# Patient Record
Sex: MALE | Race: WHITE
[De-identification: names, ages, dates, MRNs, and addresses within clinical notes are randomized per-mention and may not be internally consistent; named-entity substitution may affect disease eponyms.]

---

## 2019-01-07 ENCOUNTER — HOSPITAL ENCOUNTER (INPATIENT)
Dept: HOSPITAL 59 - ER | Age: 69
LOS: 2 days | Discharge: HOME | DRG: 192 | End: 2019-01-09
Attending: INTERNAL MEDICINE | Admitting: INTERNAL MEDICINE
Payer: COMMERCIAL

## 2019-01-07 DIAGNOSIS — I25.2: ICD-10-CM

## 2019-01-07 DIAGNOSIS — J45.909: ICD-10-CM

## 2019-01-07 DIAGNOSIS — J44.1: Primary | ICD-10-CM

## 2019-01-07 DIAGNOSIS — I48.91: ICD-10-CM

## 2019-01-07 DIAGNOSIS — R09.02: ICD-10-CM

## 2019-01-07 DIAGNOSIS — Z95.5: ICD-10-CM

## 2019-01-07 DIAGNOSIS — E78.5: ICD-10-CM

## 2019-01-07 DIAGNOSIS — R91.8: ICD-10-CM

## 2019-01-07 DIAGNOSIS — Z79.01: ICD-10-CM

## 2019-01-07 LAB
ALBUMIN SERPL-MCNC: 3.9 G/DL (ref 4–5)
ALBUMIN/GLOB SERPL: 1 {RATIO} (ref 1.1–1.8)
ALP SERPL-CCNC: 103 U/L (ref 55–149)
ALT SERPL-CCNC: 12 U/L (ref ?–41)
ANION GAP SERPL CALC-SCNC: 14 MMOL/L (ref 7–16)
AST SERPL-CCNC: 13 U/L (ref 10–50)
BASOPHILS NFR BLD: 0.1 % (ref 0–6)
BILIRUB SERPL-MCNC: 1 MG/DL (ref 0.2–1)
BUN SERPL-MCNC: 15 MG/DL (ref 8–23)
CO2 SERPL-SCNC: 26 MMOL/L (ref 22–29)
CREAT SERPL-MCNC: 0.9 MG/DL (ref 0.7–1.2)
EOSINOPHIL NFR BLD: 1 % (ref 0–6)
ERYTHROCYTE [DISTWIDTH] IN BLOOD BY AUTOMATED COUNT: 14.5 % (ref 11.5–14.5)
EST GLOMERULAR FILTRATION RATE: > 60 ML/MIN
GLOBULIN SER-MCNC: 3.8 GM/DL (ref 1.4–4.8)
GLUCOSE SERPL-MCNC: 134 MG/DL (ref 74–109)
GRANULOCYTES NFR BLD: 74.6 % (ref 47–80)
HCT VFR BLD CALC: 46.4 % (ref 42–52)
HGB BLD-MCNC: 15.3 GM/DL (ref 14–18)
LYMPHOCYTES NFR BLD AUTO: 13.2 % (ref 16–45)
MCH RBC QN AUTO: 30.5 PG (ref 27–33)
MCHC RBC AUTO-ENTMCNC: 33 G/DL (ref 32–36)
MCV RBC AUTO: 92.4 FL (ref 81–97)
MONOCYTES NFR BLD: 11.1 % (ref 0–9)
PLATELET # BLD: 165 K/UL (ref 130–400)
PMV BLD AUTO: 9.3 FL (ref 7.4–10.4)
PROT SERPL-MCNC: 7.7 G/DL (ref 6.6–8.7)
RBC # BLD AUTO: 5.02 M/UL (ref 4.4–5.7)
WBC # BLD AUTO: 12.1 K/UL (ref 4.2–12.2)

## 2019-01-07 PROCEDURE — 99223 1ST HOSP IP/OBS HIGH 75: CPT

## 2019-01-07 PROCEDURE — 96374 THER/PROPH/DIAG INJ IV PUSH: CPT

## 2019-01-07 PROCEDURE — 99239 HOSP IP/OBS DSCHRG MGMT >30: CPT

## 2019-01-07 PROCEDURE — 84484 ASSAY OF TROPONIN QUANT: CPT

## 2019-01-07 PROCEDURE — 80053 COMPREHEN METABOLIC PANEL: CPT

## 2019-01-07 PROCEDURE — 94761 N-INVAS EAR/PLS OXIMETRY MLT: CPT

## 2019-01-07 PROCEDURE — 93010 ELECTROCARDIOGRAM REPORT: CPT

## 2019-01-07 PROCEDURE — 99285 EMERGENCY DEPT VISIT HI MDM: CPT

## 2019-01-07 PROCEDURE — 94640 AIRWAY INHALATION TREATMENT: CPT

## 2019-01-07 PROCEDURE — 93005 ELECTROCARDIOGRAM TRACING: CPT

## 2019-01-07 PROCEDURE — 85027 COMPLETE CBC AUTOMATED: CPT

## 2019-01-07 PROCEDURE — 71045 X-RAY EXAM CHEST 1 VIEW: CPT

## 2019-01-07 PROCEDURE — 85025 COMPLETE CBC W/AUTO DIFF WBC: CPT

## 2019-01-07 NOTE — EMERGENCY DEPARTMENT RECORD
History of Present Illness





- General


Chief Complaint: Shortness of breath


Stated Complaint: CAITLIN


Time Seen by Provider: 19 21:42


Source: Patient


Mode of Arrival: Wheelchair


Limitations: No limitations





- History of Present Illness


Initial Comments: 





69 yo male presents to ED for evaluation of difficulty in breathing this evening

, reports a history of COPD and pneumothorax.  Patient denies fevers, chills, 

or productive cough symptoms.  Patient denies chest discomfort symptoms, 

history of PE, or lower extremity edema.  


MD Complaint: Shortness of breath


Onset/Timin


-: Days(s)


Severity: Moderate


Consistency: Constant


Improves With: Oxygen


Known History Of: COPD


Associated Symptoms: Denies other symptoms





- Related Data


Home Oxygen Therapy: No


 Home Medications











 Medication  Instructions  Recorded  Confirmed  Last Taken


 


Alendronate Sodium [Fosamax] 70 mg PO WEEKLY 19


 


Apixaban [Eliquis] 5 mg PO ASDIR 19


 


Atorvastatin Calcium 80 mg PO DAILY 19


 


Budesonide/Formoterol Fumarate 6 gm IH DAILY 19





[Symbicort 160-4.5 Mcg Inhaler]    


 


Magnesium Oxide [Magnesium] 250 mg PO DAILY 19


 


Metoprolol Succinate 25 mg PO DAILY 19


 


Nitroglycerin 0.4MG [Nitrostat 1 tab SL ASDIR 19





0.4MG]    


 


Prednisone [Prednisone 10Mg] 10 mg PO DAILY 19


 


Tiotropium Bromide [Spiriva] 18 mcg IH DAILY 19


 


Ubidecarenone [Coenzyme Q10] 30 mg PO DAILY 19











 Allergies











Allergy/AdvReac Type Severity Reaction Status Date / Time


 


morphine AdvReac  BEHAVIORAL Verified 19 21:53





   CHANGES  














Review of Systems


Constitutional: Denies: Chills, Fever, Malaise, Night sweats


Eyes: Denies: Eye discharge, Eye pain


ENT: Denies: Congestion, Ear pain, Epistaxis


Respiratory: Reports: Dyspnea.  Denies: Cough


Cardiovascular: Reports: Dyspnea on exertion.  Denies: Chest pain, Edema


Endocrine: Denies: Fatigue, Heat or cold intolerance


Gastrointestinal: Denies: Abdominal pain, Nausea, Vomiting


Genitourinary: Denies: Incontinence, Retention


Musculoskeletal: Denies: Arthralgia, Back pain, Gout, Joint swelling


Skin: Denies: Bruising, Change in color, Change in hair/nails


Neurological: Denies: Abnormal gait, Confusion, Headache, Seizure


Psychiatric: Denies: Anxiety


Hematological/Lymphatic: Denies: Anemia, Blood Clots





Physical Exam





- General


General Appearance: Alert, Oriented x3, Cooperative, Moderate distress


Limitations: No limitations





- Head


Head exam: Atraumatic, Normocephalic, Normal inspection


Head exam detail: negative: Abrasion, Contusion, Bautista's sign, General 

tenderness, Hematoma, Laceration





- Eye


Eye exam: Normal appearance.  negative: Conjunctival injection, Periorbital 

swelling, Periorbital tenderness, Scleral icterus





- ENT


Ear exam: negative: Auricular hematoma, Auricular trauma


Nasal Exam: negative: Active bleeding, Discharge, Dried blood, Foreign body


Mouth exam: negative: Drooling, Laceration, Muffled voice, Tongue elevation





- Neck


Neck exam: Normal inspection.  negative: Meningismus, Tenderness





- Respiratory


Respiratory exam: Decreased breath sounds, Respiratory distress.  negative: 

Rales, Rhonchi, Stridor





- Cardiovascular


Cardiovascular Exam: Normal rhythm, Normal heart sounds, Tachycardia





- GI/Abdominal


GI/Abdominal exam: Soft.  negative: Rebound, Rigid, Tenderness





- Rectal


Rectal exam: Deferred





- 


 exam: Deferred





- Extremities


Extremities exam: Normal inspection.  negative: Calf tenderness, Pedal edema, 

Tenderness





- Back


Back exam: Denies: CVA tenderness (R), CVA tenderness (L)





- Neurological


Neurological exam: Alert, Normal gait, Oriented X3





- Psychiatric


Psychiatric exam: Normal affect, Normal mood





- Skin


Skin exam: Normal color.  negative: Abrasion


Type of lesion: negative: abrasion





Course





- Reevaluation(s)


Reevaluation #1: 





19 21:47


Patient was seen and examined, cyanosis noted to the fingers c/w hypoxia, 

moderate respiratory distress is present on examination.


Duoneb and solumedrol ordered as well as laboratory studies/portable chest film.


Reevaluation #2: 





19 21:50


EKG: Sinus tachycardia 103


Normal axis, normal intervals


No acute ST-T wave changes are identified.


Reevaluation #3: 





19 22:15


Laboratory studies were reviewed and appear grossly unremarkable for an acute 

process.


Reevaluation #4: 





19 23:06


Patient was reassessed following 2nd treatment, patient id beginning to move 

more air.  


Pulse 90's, biox 92% on 2 L NC.  Will admit for COPD exacerbation.


Reevaluation #5: 





19 07:50


Case was discussed with Dolly (ARELY), will accept admission at this time.





Medical Decision Making





- Lab Data


Result diagrams: 


 19 21:42





 19 21:42





Disposition


Disposition: Admit


Clinical Impression: 


 COPD exacerbation, Hypoxia





Disposition: Still a Patient at [facility] ([facility]


Decision to Admit: Admit from ER


Decision to Admit Date: 19


Decision to Admit Time: 23:07


Condition: (2) Stable


Time of Disposition: 23:08





Quality





- Quality Measures


Quality Measures: N/A





- Blood Pressure Screening


Does Patient Have Any of the Following: Active Dx of HTN


Blood Pressure Classification: Pre-Hypertensive BP Reading


Systolic Measurement: 128


Diastolic Measurement: 75


Screening for High Blood Pressure: Patient Exclusion, Hx of HTN []

## 2019-01-08 RX ADMIN — ALBUTEROL SULFATE SCH MG: 2.5 SOLUTION RESPIRATORY (INHALATION) at 14:22

## 2019-01-08 RX ADMIN — ALBUTEROL SULFATE SCH MG: 2.5 SOLUTION RESPIRATORY (INHALATION) at 18:11

## 2019-01-08 RX ADMIN — NICOTINE SCH: 14 PATCH, EXTENDED RELEASE TOPICAL at 10:59

## 2019-01-08 RX ADMIN — DEXTROSE MONOHYDRATE SCH MLS/HR: 5 INJECTION, SOLUTION INTRAVENOUS at 03:01

## 2019-01-08 RX ADMIN — IPRATROPIUM BROMIDE AND ALBUTEROL SULFATE SCH ML: .5; 2.5 SOLUTION RESPIRATORY (INHALATION) at 05:43

## 2019-01-08 RX ADMIN — IPRATROPIUM BROMIDE AND ALBUTEROL SULFATE SCH: .5; 2.5 SOLUTION RESPIRATORY (INHALATION) at 10:27

## 2019-01-08 RX ADMIN — SODIUM CHLORIDE PRN MLS/HR: 0.9 INJECTION, SOLUTION INTRAVENOUS at 03:01

## 2019-01-08 RX ADMIN — METHYLPREDNISOLONE SODIUM SUCCINATE SCH MG: 125 INJECTION, POWDER, FOR SOLUTION INTRAMUSCULAR; INTRAVENOUS at 22:26

## 2019-01-08 RX ADMIN — ALBUTEROL SULFATE SCH MG: 2.5 SOLUTION RESPIRATORY (INHALATION) at 10:15

## 2019-01-08 RX ADMIN — ALBUTEROL SULFATE SCH MG: 2.5 SOLUTION RESPIRATORY (INHALATION) at 21:52

## 2019-01-08 RX ADMIN — NICOTINE SCH PATCH: 14 PATCH, EXTENDED RELEASE TOPICAL at 07:38

## 2019-01-08 NOTE — RADIOLOGY REPORT
EXAM:  AP CHEST 



HISTORY:  DIFFICULTY IN BREATHING, HISTORY OF COPD.  



TECHNIQUE:  AP portable views of the chest were obtained.  



Comparison:  None.  



FINDINGS:  The heart size is normal.  The lungs do appear hyperinflated 
suggesting COPD.  There is some interstitial infiltrate particularly in the 
left upper lobe.  This may be chronic fibrosis although old films would be 
useful to confirm a chronic appearance and exclude an acute infiltrate in the 
left upper lobe.  Alternatively, short term follow-up could also be obtained to 
see if there is any change in this pattern to suggest an acute component.  
There is also blunting of the left lateral costophrenic angle by fluid or 
thickened pleura.  No pneumothorax evident.  There is probably a small anchor 
screw overlying the right humeral head related to prior shoulder surgery.  



IMPRESSION:  

1.  HYPERINFLATION CONSISTENT WITH COPD.  



2.  BLUNTING OF THE LEFT LATERAL COSTOPHRENIC ANGLE BY FLUID OR THICKENED 
PLEURA.  



3.  STREAKY INFILTRATE LEFT UPPER LOBE COULD BE ACUTE INFILTRATE OR CHRONIC 
FIBROSIS.  COMPARISON WITH OLD FILMS OR SHORT TERM FOLLOW-UP MAY BE USEFUL IN 
THIS DISTINCTION.  



4.  APPARENT POSTOP CHANGE RIGHT SHOULDER.  



JOB NUMBER:  [id number]
[facility]

## 2019-01-08 NOTE — HISTORY & PHYSICAL
History of Present Illness





- Date of Service


Date of Service for History & Physical: 01/08/19





- History of Present Illness


Admitting Diagnosis: COPD exacerbation.  Hypoxia


History of Present Illness: 


   Mr. Hussein is a 69yo male who presented to the ED on the evening of 1/07 

for evaluation of difficulty in breathing, reports a history of COPD and 

pneumothorax.  Patient denies fevers, chills, or productive cough symptoms.  

Wife does state that he has been more tired than usual since last Friday.  

Patient denies chest discomfort symptoms, history of PE, or lower extremity 

edema.  His history includes:  COPD, hyperlipidemia, every day smoker.  





   In the ED, he was noted to have cyanosis of fingers, moderate respiratory 

distress up on examination.  Duoneb and solumedrol ordered as well as 

laboratory studies/portable chest film.  EKG: Sinus tachycardia 103, normal axis

, normal intervals, no acute ST-T wave changes are identified.  Labs were 

negative for acute process.  Chest xray showed hyperinflation, LIZZ streaky 

infiltrates vs. chronic fibrosis, levaquin was initiated.  He was admitted for 

COPD exacerbation.





1/08/19: 


  Pt. is resting in bed, he reports much improved work of breathing.  95% 

oxygen on 2L nc.  Will plan to continue home symbicort, albuterol nebs q4h 

while awake, levaquin 750mg q24h, and solumedrol 60mg daily.  Will continue to 

monitor vitals and labs.   











PCP:  Keily Goodman


Pulmolologist:  Dr. Gonsales





Travel Screening





- Travel/Exposure Within Last 30 Days


Have you traveled within the last 30 days?: No





- Travel/Exposure Within Last Year


Have you traveled outside the [place] in the last year?: No





- Additonal Travel Details


Have you been exposed to anyone with a communicable illness?: No





- Travel Symptoms


Symptom Screening: Lack of Appetite





Review of Systems


Constitutional: Denies: Chills, Fever, Malaise, Night sweats


Eyes: Denies: Eye discharge, Eye pain


ENT: Denies: Congestion, Ear pain, Epistaxis


Respiratory: Reports: Dyspnea.  Denies: Cough


Cardiovascular: Reports: Dyspnea on exertion.  Denies: Chest pain, Edema


Endocrine: Denies: Fatigue, Heat or cold intolerance


Gastrointestinal: Denies: Abdominal pain, Nausea, Vomiting


Genitourinary: Denies: Incontinence, Retention


Musculoskeletal: Denies: Arthralgia, Back pain, Gout, Joint swelling


Skin: Denies: Bruising, Change in color, Change in hair/nails


Neurological: Denies: Abnormal gait, Confusion, Headache, Seizure


Psychiatric: Denies: Anxiety


Hematological/Lymphatic: Denies: Anemia, Blood Clots





Past Medical History





- SOCIAL HISTORY


Smoking Status: Heavy tobacco smoker (>10/day)





- RESPIRATORY


Hx Respiratory Disorders: Yes


Hx COPD: Yes





- CARDIOVASCULAR


Hx Cardio Disorders: Yes


Hx Chest Pain: Yes


Hx Heart Attack: Yes


Hx Irregular Heartbeat: Yes


Hx Palpitations: Yes





- NEURO


Hx Neuro Disorders: Yes


Hx Seizures: Yes ("non-epileptiform spells", last 2016)





- GI


Hx GI Disorders: No





- 


Hx Genitourinary Disorders: No





- ENDOCRINE


Hx Endocrine Disorders: No





- MUSCULOSKELETAL


Hx Musculoskeletal Disorders: Yes


Hx Back Injury: Yes (L4-5 cage)





- PSYCH


Hx Psych Problems: Yes


Hx Anxiety: Yes


Hx Depression: Yes





- HEMATOLOGY/ONCOLOGY


Hx Hematology/Oncology Disorders: No


Comment:: on blood thinners for afib





Family Medical History


Any Significant Family History?: Yes


Hx Cancer: Father





H&P Meds/Allergies





- Allergies


Allergies: 


 Allergies











Allergy/AdvReac Type Severity Reaction Status Date / Time


 


morphine AdvReac  BEHAVIORAL Verified 01/07/19 21:53





   CHANGES  














- Home Medications


 Home Medications











 Medication  Instructions  Recorded  Confirmed  Last Taken


 


Alendronate Sodium [Fosamax] 70 mg PO WEEKLY 01/07/19 01/07/19 01/07/19


 


Apixaban [Eliquis] 5 mg PO BID 01/07/19 01/08/19 01/07/19


 


Atorvastatin Calcium 80 mg PO QHS 01/07/19 01/08/19 01/07/19


 


Budesonide/Formoterol Fumarate 2 inh IH BID 01/07/19 01/08/19 01/07/19





[Symbicort 160-4.5 Mcg Inhaler]    


 


Metoprolol Succinate 25 mg PO DAILY 01/07/19 01/07/19 01/07/19


 


Nitroglycerin 0.4MG [Nitrostat 1 tab SL Q5MIN PRN 01/07/19 01/08/19 01/07/19





0.4MG]    


 


Prednisone [Prednisone 10Mg] 10 mg PO QHS 01/07/19 01/08/19 01/07/19


 


Tiotropium Bromide [Spiriva] 18 mcg IH DAILY 01/07/19 01/07/19 01/07/19


 


Ubidecarenone [Coenzyme Q10] 30 mg PO QHS 01/07/19 01/08/19 01/07/19














- Active Medications


Active Medications: 


 Current Medications





Acetaminophen (Tylenol 500mg Tab)  1,000 mg PO Q6H PRN


   PRN Reason: PAIN - MILD(1-4)/FEVER


Albuterol Sulfate (Albuterol Sulfate)  2.5 mg INH RESP.Q1H PRN


   PRN Reason: DIFFICULTY IN BREATHING


Albuterol Sulfate (Albuterol Sulfate)  2.5 mg INH RESP.Q4H.[facility]


   Last Admin: 01/08/19 10:15 Dose:  2.5 mg


Sodium Chloride ()  1,000 mls @ 100 mls/hr IV .Q10H PRN


   PRN Reason: LARGE VOLUME IV


   Last Admin: 01/08/19 03:01 Dose:  100 mls/hr


Levofloxacin/Dextrose (Levaquin 750mg Ivpb)  750 mg in 150 mls @ 125 mls/hr 

IVPB Q24H [facility]


   Stop: 01/13/19 03:01


   Last Infusion: 01/08/19 04:56 Dose:  Infused


Methylprednisolone Sodium Succinate (Solu-Medrol)  125 mg IVP DAILY [facility]


Nicotine (Nicotine 14mg)  1 patch TD Q24H [facility]


   Last Admin: 01/08/19 10:59 Dose:  Not Given


Patient Own Med: (Atorvastatin 80mg)  1 each PO QHS [facility]


Patient Own Med: (Symbicort 160/4.5)  2 each INH BID [facility]


Patient Own Med: Metoprolol Succinate 25 Mg  1 each PO DAILY [facility]


Patient Own Med: (Spiriva Handihaler)  1 each INH DAILY [facility]


Patient Own Med: (Apixaban 5 Mg)  1 each PO BID [facility]











Physical Exam





- Vital Signs


Vital Signs: 


 Vital Signs - Last 24 Hrs











  Temp Pulse Pulse Pulse Resp BP BP


 


 01/08/19 10:17   71    16  


 


 01/08/19 10:15  97.7 F      104/59 


 


 01/08/19 08:00  97.7 F    74  20   104/59


 


 01/08/19 05:43   71    20  


 


 01/07/19 23:49  97.5 F L    98 H  17  


 


 01/07/19 23:31    90   26 H   109/48


 


 01/07/19 22:44   100 H    18  


 


 01/07/19 22:02  98.9 F   104 H   28 H   113/69


 


 01/07/19 21:45   107 H    16  


 


 01/07/19 21:35   121 H    40 H  128/75 














  Pulse Ox


 


 01/08/19 10:17  95


 


 01/08/19 10:15 


 


 01/08/19 08:00  93 L


 


 01/08/19 05:43  95


 


 01/07/19 23:49  93 L


 


 01/07/19 23:31  92 L


 


 01/07/19 22:44 


 


 01/07/19 22:02  93 L


 


 01/07/19 21:45 


 


 01/07/19 21:35  85 L














- General


General Appearance: Alert, Oriented x3, Cooperative, No acute distress


Limitations: No limitations





- Head


Head exam: Atraumatic, Normocephalic, Normal inspection


Head exam detail: negative: Abrasion, Contusion, Bautista's sign, General 

tenderness, Hematoma, Laceration





- Eye


Eye exam: Normal appearance.  negative: Conjunctival injection, Periorbital 

swelling, Periorbital tenderness, Scleral icterus





- ENT


Ear exam: negative: Auricular hematoma, Auricular trauma


Nasal Exam: negative: Active bleeding, Discharge, Dried blood, Foreign body


Mouth exam: negative: Drooling, Laceration, Muffled voice, Tongue elevation





- Neck


Neck exam: Normal inspection.  negative: Meningismus, Tenderness





- Respiratory


Respiratory exam: Wheezes (expiratory).  negative: Rales, Rhonchi, Stridor





- Cardiovascular


Cardiovascular Exam: Regular rate, Normal rhythm, Normal heart sounds





- GI/Abdominal


GI/Abdominal exam: Soft.  negative: Rebound, Rigid, Tenderness





- Rectal


Rectal exam: Deferred





- 


 exam: Deferred





- Extremities


Extremities exam: Normal inspection.  negative: Calf tenderness, Pedal edema, 

Tenderness





- Back


Back exam: Denies: CVA tenderness (R), CVA tenderness (L)





- Neurological


Neurological exam: Alert, Normal gait, Oriented X3





- Psychiatric


Psychiatric exam: Normal affect, Normal mood





- Skin


Skin exam: Normal color.  negative: Abrasion


Type of lesion: negative: abrasion





Results





- Labs


Result Diagrams: 


 01/07/19 21:42





 01/07/19 21:42


Labs Last 24 Hours: 


 Laboratory Results - last 24 hr











  01/07/19 01/07/19





  21:42 21:42


 


WBC  12.1 


 


RBC  5.02 


 


Hgb  15.3 


 


Hct  46.4 


 


MCV  92.4 


 


MCH  30.5 


 


MCHC  33.0 


 


RDW  14.5 


 


Plt Count  165 


 


MPV  9.3 


 


Gran %  74.6 


 


Lymphocytes %  13.2 L 


 


Monocytes %  11.1 H 


 


Eosinophils %  1.0 


 


Basophils %  0.1 


 


Sodium   140


 


Potassium   4.0


 


Chloride   100


 


Carbon Dioxide   26.0


 


Anion Gap   14.0


 


BUN   15


 


Creatinine   0.9


 


Estimated GFR   > 60


 


Random Glucose   134 H


 


Calcium   9.1


 


Total Bilirubin   1.00


 


AST   13


 


ALT   12


 


Alkaline Phosphatase   103


 


Troponin T   < 0.010


 


Total Protein   7.7


 


Albumin   3.9 L


 


Globulin   3.8


 


Albumin/Globulin Ratio   1.0 L














- Imaging and Cardiology


  ** Chest x-ray


Status: Report reviewed (Chest xray showed hyperinflation, LIZZ streaky 

infiltrates vs. chronic fibrosis)





VTE H&P Assessment





- Risk for VTE


Risk for VTE: Yes


Risk Level: Low


Risk Assessment Date: 01/08/19


Risk Assessment Time: 11:51


VTE Orders Placed or Will Be Placed: Yes





Plan





- Inpatient Certification


Inpatient Certification: 


Admit to inpatient care: Based on my medical assessment, after consideration of 

patient's risk factors (age, co-morbidities and patient presenting symptoms and 

acuity), I expect that this patient will remain in the hospital greater than or 

equal to two midnights and that the services needed warrant inpatient care 

because:





Patient Risk Factors: [age, comorbidities]





Estimated length of stay: [48-96 hours]  The patient may reasonably be expected 

to be discharged or transferred to a hospital within 96 hours after admission 

to [facility].





Services needed: [iv abx, respiratory treatments]





Post hospital care (if known): []





I certify that my determination is in accordance with my understanding of 

Medicare requirements for reasonable and necessary inpatient services.





01/08/19 11:55








- Detailed Diagnosis and Plan


(1) COPD exacerbation


Current Visit: Yes   Status: Acute   Base Code: J44.1 - CHRONIC OBSTRUCTIVE 

PULMONARY DISEASE W (ACUTE) EXACERBATION   Comment: 1/08/19:


-Chest xray showed hyperinflation, LIZZ streaky infiltrates vs. chronic fibrosis


-solumedrol 60mg daily, albuterol nebs q4h, oxygen 2L nc, levaquin 750mg daily, 

continue home symbicort


   





(2) Left upper lobe pulmonary infiltrate


Current Visit: Yes   Status: Acute   Base Code: R91.8 - OTHER NONSPECIFIC 

ABNORMAL FINDING OF LUNG FIELD   Comment: 1/08/19:


-Chest xray showed hyperinflation, LIZZ streaky infiltrates vs. chronic fibrosis


-levaquin 750mg q25h


   





(3) Tobacco dependence due to cigarettes


Current Visit: Yes   Status: Acute   Base Code: F17.210 - NICOTINE DEPENDENCE, 

CIGARETTES, UNCOMPLICATED   Comment: 1/08/19:


-nicotine patch ordered during admission   





(4) At risk for deep venous thrombosis


Current Visit: Yes   Status: Acute   Base Code: Z91.89 - OTH PERSONAL RISK 

FACTORS, NOT ELSEWHERE CLASSIFIED   Comment: 1/08/19:


-lovenox 40mg sc daily for prophylaxis   





(5) Full code status


Current Visit: Yes   Status: Acute   Base Code: Z78.9 - OTHER SPECIFIED HEALTH 

STATUS   Comment: 1/08/19:


-pt is a full code

## 2019-01-09 LAB
ALBUMIN SERPL-MCNC: 3.3 G/DL (ref 4–5)
ALBUMIN/GLOB SERPL: 1 {RATIO} (ref 1.1–1.8)
ALP SERPL-CCNC: 92 U/L (ref 55–149)
ALT SERPL-CCNC: 10 U/L (ref ?–41)
ANION GAP SERPL CALC-SCNC: 9 MMOL/L (ref 7–16)
AST SERPL-CCNC: 10 U/L (ref 10–50)
BASOPHILS NFR BLD: 0 % (ref 0–6)
BASOPHILS NFR BLD: 0 % (ref 0–6)
BILIRUB SERPL-MCNC: 0.3 MG/DL (ref 0.2–1)
BUN SERPL-MCNC: 15 MG/DL (ref 8–23)
CO2 SERPL-SCNC: 26 MMOL/L (ref 22–29)
CREAT SERPL-MCNC: 0.7 MG/DL (ref 0.7–1.2)
EOSINOPHIL NFR BLD: 0 % (ref 0–6)
EOSINOPHIL NFR BLD: 0 % (ref 0–6)
ERYTHROCYTE [DISTWIDTH] IN BLOOD BY AUTOMATED COUNT: 14 % (ref 11.5–14.5)
EST GLOMERULAR FILTRATION RATE: > 60 ML/MIN
GLOBULIN SER-MCNC: 3.4 GM/DL (ref 1.4–4.8)
GLUCOSE SERPL-MCNC: 151 MG/DL (ref 74–109)
HCT VFR BLD CALC: 44.3 % (ref 42–52)
HGB BLD-MCNC: 14.4 GM/DL (ref 14–18)
LYMPHOCYTES NFR BLD AUTO: 8.6 % (ref 16–45)
LYMPHOCYTES NFR BLD: 8 % (ref 16–45)
MCH RBC QN AUTO: 30.3 PG (ref 27–33)
MCHC RBC AUTO-ENTMCNC: 32.5 G/DL (ref 32–36)
MCV RBC AUTO: 93.1 FL (ref 81–97)
MONOCYTES NFR BLD: 5.6 % (ref 0–9)
MONOCYTES NFR BLD: 6 % (ref 0–9)
NEUTROPHILS NFR BLD AUTO: 86 % (ref 47–80)
NEUTS BAND NFR BLD: 0 % (ref 0–5)
PLATELET # BLD: 167 K/UL (ref 130–400)
PMV BLD AUTO: 9.6 FL (ref 7.4–10.4)
PROT SERPL-MCNC: 6.7 G/DL (ref 6.6–8.7)
RBC # BLD AUTO: 4.76 M/UL (ref 4.4–5.7)
WBC # BLD AUTO: 11.7 K/UL (ref 4.2–12.2)

## 2019-01-09 RX ADMIN — NICOTINE SCH: 14 PATCH, EXTENDED RELEASE TOPICAL at 09:30

## 2019-01-09 RX ADMIN — SODIUM CHLORIDE PRN MLS/HR: 0.9 INJECTION, SOLUTION INTRAVENOUS at 02:31

## 2019-01-09 RX ADMIN — DEXTROSE MONOHYDRATE SCH MLS/HR: 5 INJECTION, SOLUTION INTRAVENOUS at 02:31

## 2019-01-09 RX ADMIN — ALBUTEROL SULFATE SCH MG: 2.5 SOLUTION RESPIRATORY (INHALATION) at 09:34

## 2019-01-09 RX ADMIN — METHYLPREDNISOLONE SODIUM SUCCINATE SCH MG: 125 INJECTION, POWDER, FOR SOLUTION INTRAMUSCULAR; INTRAVENOUS at 06:44

## 2019-01-09 RX ADMIN — ALBUTEROL SULFATE SCH MG: 2.5 SOLUTION RESPIRATORY (INHALATION) at 05:44

## 2019-01-09 RX ADMIN — ALBUTEROL SULFATE SCH MG: 2.5 SOLUTION RESPIRATORY (INHALATION) at 13:38

## 2019-01-09 NOTE — DISCHARGE SUMMARY
Providers


Discharge Summary Date: 01/09/19


Date of admission: 


01/07/19 23:40





Expected Date of Discharge: 01/09/19


Attending physician: 


AGAPITO STREETER





Primary care physician: 


KEILY GOODMAN D.O.








Physical Exam





- Vital Signs


Vital Signs: 


 Vital Signs - Last 24 Hrs











  Temp Pulse Pulse Resp BP BP Pulse Ox


 


 01/09/19 09:34   78   16    92 L


 


 01/09/19 09:32   80   16    92 L


 


 01/09/19 08:00  97.7 F   80  16   103/62  90 L


 


 01/09/19 05:44   76   92 H    12 L


 


 01/08/19 21:53   80   12    98


 


 01/08/19 20:00  97.9 F   85  16   106/61  91 L


 


 01/08/19 18:11   77   18    90 L


 


 01/08/19 16:00  97.8 F   72  16   112/66  90 L


 


 01/08/19 14:24   77   18    97


 


 01/08/19 10:17   71   16    95


 


 01/08/19 10:15  97.7 F     104/59  














- General


General Appearance: Alert, Oriented x3, Cooperative, No acute distress


Limitations: No limitations





- Head


Head exam: Atraumatic, Normocephalic, Normal inspection


Head exam detail: negative: Abrasion, Contusion, Bautista's sign, General 

tenderness, Hematoma, Laceration





- Eye


Eye exam: Normal appearance.  negative: Conjunctival injection, Periorbital 

swelling, Periorbital tenderness, Scleral icterus





- ENT


Ear exam: negative: Auricular hematoma, Auricular trauma


Nasal Exam: negative: Active bleeding, Discharge, Dried blood, Foreign body


Mouth exam: negative: Drooling, Laceration, Muffled voice, Tongue elevation





- Neck


Neck exam: Normal inspection.  negative: Meningismus, Tenderness





- Respiratory


Respiratory exam: Wheezes (slight expiratory).  negative: Rales, Rhonchi, 

Stridor





- Cardiovascular


Cardiovascular Exam: Regular rate, Normal rhythm, Normal heart sounds





- GI/Abdominal


GI/Abdominal exam: Soft.  negative: Rebound, Rigid, Tenderness





- Rectal


Rectal exam: Deferred





- 


 exam: Deferred





- Extremities


Extremities exam: Normal inspection.  negative: Calf tenderness, Pedal edema, 

Tenderness





- Back


Back exam: Denies: CVA tenderness (R), CVA tenderness (L)





- Neurological


Neurological exam: Alert, Normal gait, Oriented X3





- Psychiatric


Psychiatric exam: Normal affect, Normal mood





- Skin


Skin exam: Normal color.  negative: Abrasion


Type of lesion: negative: abrasion





Hospitalization





- Hospitalization


Admission Diagnosis: COPD exacerbation.  Hypoxia





- Problem List/Discharge Diagnosis


(1) COPD exacerbation


Current Visit: Yes   Status: Acute   Base Code: J44.1 - CHRONIC OBSTRUCTIVE 

PULMONARY DISEASE W (ACUTE) EXACERBATION   Comment: 1/09/19:


-Chest xray showed hyperinflation, LIZZ streaky infiltrates vs. chronic fibrosis


-Pt has maintained 90% on room air, labs unremarkable, he has remained afebrile


-Changed abx to PO, changed steroid to PO (prednisone 40mg daily), added 

mucinex 1200mg bid, will plan to d/c home today


-Pt has neb and 2 boxes of albuterol solution at home


   





(2) Left upper lobe pulmonary infiltrate


Current Visit: Yes   Status: Acute   Base Code: R91.8 - OTHER NONSPECIFIC 

ABNORMAL FINDING OF LUNG FIELD   Comment: 1/09/19:


-Chest xray showed hyperinflation, LIZZ streaky infiltrates vs. chronic fibrosis


-Pt has maintained 90% on room air, labs unremarkable, he has remained afebrile


-Changed abx to PO, changed steroid to PO (prednisone 40mg daily), added 

mucinex 1200mg bid, will plan to d/c home today


-Pt has neb and 2 boxes of albuterol solution at home


   





(3) Tobacco dependence due to cigarettes


Current Visit: Yes   Status: Acute   Base Code: F17.210 - NICOTINE DEPENDENCE, 

CIGARETTES, UNCOMPLICATED   Comment: 1/09/19:


-nicotine patch ordered during admission


-Highly encouraged pt to consider smoking cessation   





(4) At risk for deep venous thrombosis


Current Visit: Yes   Status: Acute   Base Code: Z91.89 - OTH PERSONAL RISK 

FACTORS, NOT ELSEWHERE CLASSIFIED   Comment: 1/09/19:


-Will not d/c with dvt prophylaxis   





(5) Full code status


Current Visit: Yes   Status: Acute   Base Code: Z78.9 - OTHER SPECIFIED HEALTH 

STATUS   Comment: 1/09/19:


-pt is a full code   





- Hospitalization Course


Disposition: Home, Self-Care


Hospital Course: 


   Mr. Hussein is a 69yo male who presented to the ED on the evening of 1/07 

for evaluation of difficulty in breathing, reports a history of COPD and 

pneumothorax.  Patient denies fevers, chills, or productive cough symptoms.  

Wife does state that he has been more tired than usual since last Friday.  

Patient denies chest discomfort symptoms, history of PE, or lower extremity 

edema.  His history includes:  COPD, hyperlipidemia, every day smoker.  





   In the ED, he was noted to have cyanosis of fingers, moderate respiratory 

distress up on examination.  Duoneb and solumedrol ordered as well as 

laboratory studies/portable chest film.  EKG: Sinus tachycardia 103, normal axis

, normal intervals, no acute ST-T wave changes are identified.  Labs were 

negative for acute process.  Chest xray showed hyperinflation, LIZZ streaky 

infiltrates vs. chronic fibrosis, levaquin was initiated.  He was admitted for 

COPD exacerbation.





1/08/19: 


  Pt. is resting in bed, he reports much improved work of breathing.  95% 

oxygen on 2L nc.  Will plan to continue home symbicort, albuterol nebs q4h 

while awake, levaquin 750mg q24h, and solumedrol 60mg daily.  Will continue to 

monitor vitals and labs.   





1/09/19:


   Pt has maintained 90% on room air, labs unremarkable, he has remained 

afebrile.  Changed abx to PO, changed steroid to PO (prednisone 40mg daily), 

added mucinex 1200mg bid, will plan to d/c home today.  Pt has neb and 2 boxes 

of albuterol solution at home.








PCP:  Keily Goodman


Pulmolologist:  Dr. Gonsales


Procedures: 


Imaging and X-Rays





01/07/19 21:47


CHEST 1 VIEW [RAD] Stat 








Cardiology Procedures





01/07/19 21:42


EKG NOW 





01/08/19 02:30


Cardiac Monitor .Continuous 











Abnormal Labs: 


 Abnormal Lab Results











  01/07/19 01/07/19 01/09/19 Range/Units





  21:42 21:42 06:37 


 


Neutrophils %    86.0 H  (47-80)  %


 


Lymphocytes %  13.2 L   8.6 L  (16-45)  %


 


Monocytes %  11.1 H    (0-9)  %


 


Lymphocytes    8.0 L  (16-45)  %


 


Random Glucose   134 H   ()  mg/dL


 


Calcium     (8.8-10.2)  mg/dL


 


Albumin   3.9 L   (4.0-5.0)  g/dL


 


Albumin/Globulin Ratio   1.0 L   (1.1-1.8)  














  01/09/19 Range/Units





  06:37 


 


Neutrophils %   (47-80)  %


 


Lymphocytes %   (16-45)  %


 


Monocytes %   (0-9)  %


 


Lymphocytes   (16-45)  %


 


Random Glucose  151 H  ()  mg/dL


 


Calcium  8.7 L  (8.8-10.2)  mg/dL


 


Albumin  3.3 L  (4.0-5.0)  g/dL


 


Albumin/Globulin Ratio  1.0 L  (1.1-1.8)  











Condition at Discharge: (2) Stable


Discharge Diagnosis: COPD exacerbation, LIZZ pneumonia





VTE Discharge


VTE Reason For No Overlap Therapy: Not Indicated





Discharge Medications





- Discharge Medications


Prescriptions: 


Levofloxacin [Levaquin] 750 mg PO DAILY #5 tab


Prednisone [Prednisone 20Mg] 20 mg PO BIDWM #7 tab


Home Medications: 


 Ambulatory Orders





Alendronate Sodium [Fosamax] 70 mg PO WEEKLY 01/07/19 [Last Taken 01/07/19]


Apixaban [Eliquis] 5 mg PO BID 01/07/19 [Last Taken 01/07/19]


Atorvastatin Calcium 80 mg PO QHS 01/07/19 [Last Taken 01/07/19]


Budesonide/Formoterol Fumarate [Symbicort 160-4.5 Mcg Inhaler] 2 inh IH BID 01/ 07/19 [Last Taken 01/07/19]


Metoprolol Succinate 25 mg PO DAILY 01/07/19 [Last Taken 01/07/19]


Nitroglycerin 0.4MG [Nitrostat 0.4MG] 1 tab SL Q5MIN PRN 01/07/19 [Last Taken 01 /07/19]


Tiotropium Bromide [Spiriva] 18 mcg IH DAILY 01/07/19 [Last Taken 01/07/19]


Ubidecarenone [Coenzyme Q10] 30 mg PO QHS 01/07/19 [Last Taken 01/07/19]


Albuterol Sulfate 0.083% [Neb] [Albuterol Sulfate] 2.5 mg INH RESP.Q4H.WA  

nebulization solution 01/09/19 [Last Taken Unknown]


Levofloxacin [Levaquin] 750 mg PO DAILY #5 tab 01/09/19 [Last Taken Unknown]


Prednisone [Prednisone 20Mg] 20 mg PO BIDWM #7 tab 01/09/19 [Last Taken Unknown]











Discharge Plan





- Discharge Instructions


Diet at Discharge: Regular Diet


Additional Instructions: 


Follow up with your PCP within 1 week





Start your prednisone 20mg twice daily (first home dose with dinner tonight)


Start levaquin 750mg daily (first dose tomorrow morning)





Use your albuterol nebulizer every 4 hours for at least 5 days


Buy mucinex over the counter (can take up to 1200mg twice daily).  This will 

help to thin your secretions.





Return to the ED if your symptoms worsen, or if you develop any chest pain





Quality Measures





- Quality Measures


Quality Measures: Advance Directives, Documentation of Current Medications in 

Medical Record, Elder Maltreatment Screen and Follow-Up Plan, Screening for 

High Blood Pressure and F/U Documented





- Current Medications


Quality Measure: Measure #130: Documentation of Current Medications


Documentation of Current Medications: <Current Medications Documented/Reviewed> 

[]





- Blood Pressure Screening


Quality Measure: Screening for High Blood Pressure and Follow-Up Documented


Does Patient Have Any of the Following: Active Dx of HTN


Blood Pressure Classification: Normal BP Reading


Systolic Measurement: 104


Diastolic Measurement: 59


Screening for High Blood Pressure: Patient Exclusion, Hx of HTN []





- Advance Directives


Quality Measure: Measure #47: Care Plan


Advance Directives Established: No


Advance Directives Information Provided To Patient: No


Advance Directives on File: No


Living Will: No


Power of : No


Advance Care Planning: <Care Plan/Decision Maker Documented; Discussed & 

Documented> [1128D]





- Elder Abuse Suspicion Index


Screening: Elder Abuse Suspicion Index Screening


Rely on people for bathing, dressing, shopping, banking, etc: No


Prevented from getting food, clothes, medication, etc: No


Made to feel shamed or threatened by someone: No


Forced to sign papers or use money against will: No


Feel afraid, touched in ways not wanted or hurt physically: No


Poor eye contact, withdrawn, malnourished, cuts or bruises: No


Screening Result: Negative result


EASI Reference Information: Thierno SALDIVAR, Barney C, Darling D, Wade EATON.Development and validation of a tool to assist physicians identification of 

elder abuse: The Elder Abuse Suspicion  Index (EASI ).  Journal of Elder Abuse 

and Neglect, 2008; 20 (3): 276-300.





- Elder Maltreatment Screen


Quality Measures: Elder Maltreatment Screen and Follow-Up Plan


Elder Maltreatment Screen: <Negative, No Follow-Up Plan Required> []

## 2019-02-18 ENCOUNTER — HOSPITAL ENCOUNTER (INPATIENT)
Dept: HOSPITAL 59 - ER | Age: 69
LOS: 2 days | Discharge: HOME | DRG: 191 | End: 2019-02-20
Attending: INTERNAL MEDICINE | Admitting: INTERNAL MEDICINE
Payer: COMMERCIAL

## 2019-02-18 DIAGNOSIS — I25.2: ICD-10-CM

## 2019-02-18 DIAGNOSIS — J40: ICD-10-CM

## 2019-02-18 DIAGNOSIS — F17.210: ICD-10-CM

## 2019-02-18 DIAGNOSIS — I48.91: ICD-10-CM

## 2019-02-18 DIAGNOSIS — E86.0: ICD-10-CM

## 2019-02-18 DIAGNOSIS — I25.10: ICD-10-CM

## 2019-02-18 DIAGNOSIS — J44.1: Primary | ICD-10-CM

## 2019-02-18 DIAGNOSIS — Z79.01: ICD-10-CM

## 2019-02-18 DIAGNOSIS — Z95.5: ICD-10-CM

## 2019-02-18 DIAGNOSIS — R09.02: ICD-10-CM

## 2019-02-18 DIAGNOSIS — J43.8: ICD-10-CM

## 2019-02-18 DIAGNOSIS — B37.0: ICD-10-CM

## 2019-02-18 LAB
ANION GAP SERPL CALC-SCNC: 14 MMOL/L (ref 7–16)
APPEARANCE UR: CLEAR
ARTERIAL BLOOD GAS BASE EXCESS: 1.9 MMOL/L (ref -2–3)
ARTERIAL BLOOD GAS PCO2: 45.8 MMHG (ref 35–48)
ARTERIAL PATENCY WRIST A: (no result)
BASOPHILS NFR BLD: 0 % (ref 0–6)
BILIRUB UR-MCNC: NEGATIVE MG/DL
BUN SERPL-MCNC: 17 MG/DL (ref 8–23)
CO2 SERPL-SCNC: 26 MMOL/L (ref 22–29)
COHGB MFR BLD: 2 % (ref 0–1.5)
COLOR UR: YELLOW
CREAT SERPL-MCNC: 0.9 MG/DL (ref 0.7–1.2)
EOSINOPHIL NFR BLD: 0 % (ref 0–6)
ERYTHROCYTE [DISTWIDTH] IN BLOOD BY AUTOMATED COUNT: 14.4 % (ref 11.5–14.5)
EST GLOMERULAR FILTRATION RATE: > 60 ML/MIN
FLUBV AG SPEC QL IA: NEGATIVE
GLUCOSE SERPL-MCNC: 144 MG/DL (ref 74–109)
GLUCOSE UR STRIP-MCNC: NEGATIVE MG/DL
HCO3 BLDA-SCNC: 27 MMOL/L (ref 18–23)
HCT VFR BLD CALC: 48.2 % (ref 42–52)
HGB BLD-MCNC: 14.4 G/DL (ref 14–18)
HGB BLD-MCNC: 15.5 GM/DL (ref 14–18)
KETONES UR QL STRIP: NEGATIVE
LEAD BLD-MCNC: NEGATIVE UG/DL
LYMPHOCYTES NFR BLD: 19 % (ref 16–45)
MCH RBC QN AUTO: 29.9 PG (ref 27–33)
MCHC RBC AUTO-ENTMCNC: 32.2 G/DL (ref 32–36)
MCV RBC AUTO: 92.9 FL (ref 81–97)
METHGB MFR BLD: 0 % (ref 0–1.5)
MONOCYTES NFR BLD: 7 % (ref 0–9)
NEUTROPHILS NFR BLD AUTO: 63 % (ref 47–80)
NEUTS BAND NFR BLD: 11 % (ref 0–5)
NITRITE UR QL STRIP: NEGATIVE
O2 HEMOGLOBIN: 93.4 % VOL (ref 94–99)
PLATELET # BLD: 161 K/UL (ref 130–400)
PMV BLD AUTO: 9.5 FL (ref 7.4–10.4)
PO2 BLDA: 72 MMHG (ref 83–108)
PROT UR QL STRIP: NEGATIVE
RBC # BLD AUTO: 5.19 M/UL (ref 4.4–5.7)
RBC # UR STRIP: NEGATIVE /UL
SAO2 % BLDA: 95.2 % (ref 95–98)
URINE LEUKOCYTE ESTERASE: NEGATIVE
UROBILINOGEN UR STRIP-ACNC: 1 E.U./DL (ref 0.2–1)
WBC # BLD AUTO: 8.6 K/UL (ref 4.2–12.2)

## 2019-02-18 PROCEDURE — 71045 X-RAY EXAM CHEST 1 VIEW: CPT

## 2019-02-18 PROCEDURE — 82803 BLOOD GASES ANY COMBINATION: CPT

## 2019-02-18 PROCEDURE — 93306 TTE W/DOPPLER COMPLETE: CPT

## 2019-02-18 PROCEDURE — 96374 THER/PROPH/DIAG INJ IV PUSH: CPT

## 2019-02-18 PROCEDURE — 99285 EMERGENCY DEPT VISIT HI MDM: CPT

## 2019-02-18 PROCEDURE — 96375 TX/PRO/DX INJ NEW DRUG ADDON: CPT

## 2019-02-18 PROCEDURE — 85027 COMPLETE CBC AUTOMATED: CPT

## 2019-02-18 PROCEDURE — 93010 ELECTROCARDIOGRAM REPORT: CPT

## 2019-02-18 PROCEDURE — 81003 URINALYSIS AUTO W/O SCOPE: CPT

## 2019-02-18 PROCEDURE — 84484 ASSAY OF TROPONIN QUANT: CPT

## 2019-02-18 PROCEDURE — 93005 ELECTROCARDIOGRAM TRACING: CPT

## 2019-02-18 PROCEDURE — 85379 FIBRIN DEGRADATION QUANT: CPT

## 2019-02-18 PROCEDURE — 94760 N-INVAS EAR/PLS OXIMETRY 1: CPT

## 2019-02-18 PROCEDURE — 94667 MNPJ CHEST WALL 1ST: CPT

## 2019-02-18 PROCEDURE — 99223 1ST HOSP IP/OBS HIGH 75: CPT

## 2019-02-18 PROCEDURE — 94640 AIRWAY INHALATION TREATMENT: CPT

## 2019-02-18 PROCEDURE — 70450 CT HEAD/BRAIN W/O DYE: CPT

## 2019-02-18 PROCEDURE — 87400 INFLUENZA A/B EACH AG IA: CPT

## 2019-02-18 PROCEDURE — 36600 WITHDRAWAL OF ARTERIAL BLOOD: CPT

## 2019-02-18 PROCEDURE — 94761 N-INVAS EAR/PLS OXIMETRY MLT: CPT

## 2019-02-18 PROCEDURE — 94668 MNPJ CHEST WALL SBSQ: CPT

## 2019-02-18 PROCEDURE — 85730 THROMBOPLASTIN TIME PARTIAL: CPT

## 2019-02-18 PROCEDURE — 82375 ASSAY CARBOXYHB QUANT: CPT

## 2019-02-18 PROCEDURE — 99239 HOSP IP/OBS DSCHRG MGMT >30: CPT

## 2019-02-18 PROCEDURE — 80048 BASIC METABOLIC PNL TOTAL CA: CPT

## 2019-02-18 RX ADMIN — NYSTATIN SCH: 500000 SUSPENSION ORAL at 22:32

## 2019-02-18 RX ADMIN — ATORVASTATIN CALCIUM SCH MG: 20 TABLET, FILM COATED ORAL at 22:33

## 2019-02-18 RX ADMIN — IPRATROPIUM BROMIDE AND ALBUTEROL SULFATE SCH ML: .5; 2.5 SOLUTION RESPIRATORY (INHALATION) at 21:56

## 2019-02-18 RX ADMIN — APIXABAN SCH MG: 5 TABLET, FILM COATED ORAL at 22:34

## 2019-02-18 RX ADMIN — IPRATROPIUM BROMIDE AND ALBUTEROL SULFATE SCH ML: .5; 2.5 SOLUTION RESPIRATORY (INHALATION) at 18:10

## 2019-02-18 RX ADMIN — NYSTATIN SCH ML: 500000 SUSPENSION ORAL at 22:34

## 2019-02-18 RX ADMIN — METHYLPREDNISOLONE SODIUM SUCCINATE SCH: 125 INJECTION, POWDER, FOR SOLUTION INTRAMUSCULAR; INTRAVENOUS at 19:23

## 2019-02-18 NOTE — EMERGENCY DEPARTMENT RECORD
History of Present Illness





- General


Chief complaint: Dehydration


Stated complaint: CAITLIN,DEHYDRATED


Time Seen by Provider: 19 15:10


Source: Patient, RN notes reviewed


Mode of Arrival: Ambulatory





- History of Present Illness


Initial comments: 


breathing became worse over the last 3 days and not eating and drinking  and he 

denies chest pain. Sees [facility] pulmonary on  Dr. Brewer pulmonary and 

he had a heart ablation 3 years through cardiology Dr. lozada at [facility]. 

Patient has a non epileptic spells.  Patient was getting a breathing treatment 

today at Dr. oGodman's office and had a 2 minute episode of shaking. No chest 

pain and he is weak. Patient still smokes. He refuses flu shot because he is 

sick after receiving them. Hoarse voice from trach 3 years ago. currently 

taking prednisone 10 mg per day. Currently not on home oxygen but the wife 

states his pulse ox runs at 90 %. When he walked into the Ed his pulse ox 87%

.Cardiology history of MI with two stents.





Onset/Timin


-: Week(s)


Severity: Moderate


Severity scale (1-10): 1


Consistency: Intermittent


Associated Symptoms: Shortness of breath





- Blanche Coma Scale


Eye Response: (4) Open spontaneously


Motor Response: (6) Obeys commands


Verbal Response: (5) Oriented


[place] Total: 15





- Related Data


 Home Medications











 Medication  Instructions  Recorded  Confirmed  Last Taken


 


Fexofenadine HCl [Mucinex Allergy] 180 mg PO DAILY 19 1 Day Ago





    ~19


 


Prednisone [Prednisone 20Mg] 10 mg PO DAILY 19 1 Day Ago





    ~19








 Previous Rx's











 Medication  Instructions  Recorded


 


Albuterol Sulfate 0.083% [Neb] 2.5 mg INH RESP.Q4H.WA 19





[Albuterol Sulfate] nebulization solution 











 Allergies











Allergy/AdvReac Type Severity Reaction Status Date / Time


 


morphine AdvReac  BEHAVIORAL Verified 19 15:06





   CHANGES  














Travel Screening





- Travel/Exposure Within Last 30 Days


Have you traveled within the last 30 days?: No





- Travel/Exposure Within Last Year


Have you traveled outside the [place] in the last year?: No





- Additonal Travel Details


Have you been exposed to anyone with a communicable illness?: No





- Travel Symptoms


Symptom Screening: None





Review of Systems


Reviewed: No additional complaints except as noted below


Constitutional: Reports: As per HPI, Weakness.  Denies: Chills, Fever, Malaise, 

Night sweats, Weight change


Eyes: Reports: As per HPI.  Denies: Eye discharge, Eye pain, Photophobia, 

Vision change


ENT: Reports: As per HPI, Other (white spots in the throat).  Denies: Congestion

, Dental pain, Ear pain, Epistaxis, Hearing loss, Throat pain


Respiratory: Reports: As per HPI.  Denies: Cough, Dyspnea, Hemoptysis, Stridor, 

Wheezes


Cardiovascular: Reports: As per HPI.  Denies: Arrhythmia, Chest pain, Dyspnea 

on exertion, Edema, Murmurs, Orthopnea, Palpitations, Paroxysmal nocturnal 

dyspnea, Rheumatic Fever, Syncope


Endocrine: Reports: As per HPI.  Denies: Fatigue, Heat or cold intolerance, 

Polydipsia, Polyuria


Gastrointestinal: Reports: As per HPI.  Denies: Abdominal pain, Constipation, 

Diarrhea, Hematemesis, Hematochezia, Melena, Nausea, Vomiting


Genitourinary: Reports: As per HPI.  Denies: Dysuria, Frequency, Hematuria, 

Incontinence, Retention, Testicular pain, Testicular mass, Urgency


Musculoskeletal: Reports: As per HPI.  Denies: Arthralgia, Back pain, Gout, 

Joint swelling, Myalgia, Neck pain


Skin: Reports: As per HPI.  Denies: Bruising, Change in color, Change in hair/

nails, Lesions, Pruritus, Rash


Neurological: Reports: As per HPI.  Denies: Abnormal gait, Confusion, Headache, 

Numbness, Paresthesias, Seizure, Tingling, Tremors, Vertigo, Weakness


Psychiatric: Reports: As per HPI.  Denies: Anxiety, Auditory hallucinations, 

Depression, Homicidal thoughts, Suicidal thoughts, Visual hallucinations


Hematological/Lymphatic: Reports: As per HPI.  Denies: Anemia, Blood Clots, 

Easy bleeding, Easy bruising, Swollen glands





Past Medical History





- SOCIAL HISTORY


Smoking Status: Heavy tobacco smoker (>10/day)


Alcohol Use: None


Drug Use: None





- RESPIRATORY


Hx Respiratory Disorders: Yes


Hx Bronchitis: Yes


Hx COPD: Yes


Hx Pneumonia: Yes


Comment:: Emphsema





- CARDIOVASCULAR


Hx Cardio Disorders: Yes


Hx Cardiac Cath: Yes (2 stents)


Hx Chest Pain: Yes


Hx Heart Attack: Yes


Hx Irregular Heartbeat: Yes (a-fib)


Hx Palpitations: Yes


Comment:: [facility]





- NEURO


Hx Neuro Disorders: Yes


Hx Seizures: Yes ("non-epileptiform spells", last 2016)





- GI


Hx GI Disorders: No





- 


Hx Genitourinary Disorders: No





- ENDOCRINE


Hx Endocrine Disorders: No





- MUSCULOSKELETAL


Hx Musculoskeletal Disorders: Yes


Hx Back Injury: Yes (L4-5 cage)





- PSYCH


Hx Psych Problems: Yes


Hx Anxiety: Yes


Hx Depression: Yes





- HEMATOLOGY/ONCOLOGY


Hx Hematology/Oncology Disorders: No


Comment:: on blood thinners for afib





Family Medical History


Any Significant Family History?: Yes


Hx Cancer: Father





Physical Exam





- General


General Appearance: Alert, Oriented x3, Cooperative, Mild distress





- Head


Head exam: Normal inspection





- Eye


Eye exam: Normal appearance, PERRL


Pupils: Normal accommodation





- ENT


ENT exam: Normal exam, Mucous membranes moist, Normal external ear exam, Normal 

orophraynx, TM's normal bilaterally


Ear exam: Normal external inspection.  negative: External canal tenderness


Nasal Exam: Normal inspection.  negative: Discharge, Sinus tenderness


Mouth exam: Normal external inspection, Tongue normal


Teeth exam: Normal inspection.  negative: Dental caries


Throat exam: Other (whit spots back of throat and is thrush).  negative: 

Tonsillar erythema, Tonsillar exudate





- Neck


Neck exam: Normal inspection, Full ROM.  negative: Tenderness





- Respiratory


Respiratory exam: Decreased breath sounds.  negative: Respiratory distress





- Cardiovascular


Cardiovascular Exam: Regular rate, Normal rhythm, Normal heart sounds





- GI/Abdominal


GI/Abdominal exam: Soft, Normal bowel sounds.  negative: Tenderness





- Rectal


Rectal exam: Deferred





- 


 exam: Deferred





- Extremities


Extremities exam: Normal inspection, Full ROM, Normal capillary refill.  

negative: Tenderness





- Back


Back exam: Reports: Normal inspection, Full ROM.  Denies: Muscle spasm, Rash 

noted, Tenderness





- Neurological


Neurological exam: Alert, Normal gait, Oriented X3, Reflexes normal





- Psychiatric


Psychiatric exam: Normal affect, Normal mood





- Skin


Skin exam: Dry, Intact, Normal color, Warm





Course





 Vital Signs











  19





  14:57


 


Temperature 98.9 F


 


Pulse Rate 115 H


 


Respiratory 20





Rate 


 


Blood Pressure 137/93


 


Pulse Ox 87 L














- Reevaluation(s)


Reevaluation #1: 


normally his pulse ox at home is 90% and last night pulse ox 86%


19 16:55





Reevaluation #2: 


discussed case with Dr Pineda and will admit


19 17:21








Medical Decision Making





- Data Complexity


MDM Data: Labs Ordered and/or Reviewed (wbc 8,600), X-Ray Ordered and/or 

Reviewed (CT head negative and chest looks the same as previous chest xray), 

EKG Ordered and/or Reviewed (NSR, No acute changes)





- Lab Data


Result diagrams: 


 19 15:20





 19 15:20





Disposition


Clinical Impression: 


 Dehydration, COPD exacerbation, Tobacco dependence due to cigarettes, Hypoxia, 

Thrush, Bronchitis





Decision to Admit: Admit from ER


Condition: (3) Guarded


Forms:  Patient Portal Access


Time of Disposition: 17:15





Quality





- Quality Measures


Quality Measures: N/A





- Blood Pressure Screening


Does Patient Have Any of the Following: No


Blood Pressure Classification: Hypertensive Reading


Systolic Measurement: 137


Diastolic Measurement: 93


Screening for High Blood Pressure: < Pre-Hypertensive BP, F/U Documented > [

]


Pre-Hypertensive Follow-up Interventions: Referral to alternative/primary care 

provider.

## 2019-02-19 LAB
ERYTHROCYTE [DISTWIDTH] IN BLOOD BY AUTOMATED COUNT: 14.2 % (ref 11.5–14.5)
HCT VFR BLD CALC: 44.8 % (ref 42–52)
HGB BLD-MCNC: 14.4 GM/DL (ref 14–18)
LYMPHOCYTES NFR BLD: 8 % (ref 16–45)
MCH RBC QN AUTO: 30.3 PG (ref 27–33)
MCHC RBC AUTO-ENTMCNC: 32.1 G/DL (ref 32–36)
MCV RBC AUTO: 94.3 FL (ref 81–97)
MONOCYTES NFR BLD: 2 % (ref 0–9)
NEUTROPHILS NFR BLD AUTO: 90 % (ref 47–80)
PLATELET # BLD: 160 K/UL (ref 130–400)
PMV BLD AUTO: 9.6 FL (ref 7.4–10.4)
RBC # BLD AUTO: 4.75 M/UL (ref 4.4–5.7)
WBC # BLD AUTO: 6.1 K/UL (ref 4.2–12.2)

## 2019-02-19 RX ADMIN — METOPROLOL SUCCINATE SCH MG: 25 TABLET, EXTENDED RELEASE ORAL at 10:20

## 2019-02-19 RX ADMIN — NYSTATIN SCH ML: 500000 SUSPENSION ORAL at 16:59

## 2019-02-19 RX ADMIN — IPRATROPIUM BROMIDE AND ALBUTEROL SULFATE SCH: .5; 2.5 SOLUTION RESPIRATORY (INHALATION) at 16:25

## 2019-02-19 RX ADMIN — Medication PRN MG: at 21:25

## 2019-02-19 RX ADMIN — CEFTRIAXONE SCH MLS/HR: 1 INJECTION, SOLUTION INTRAVENOUS at 21:20

## 2019-02-19 RX ADMIN — CEFTRIAXONE SCH MLS/HR: 1 INJECTION, SOLUTION INTRAVENOUS at 10:20

## 2019-02-19 RX ADMIN — SODIUM CHLORIDE PRN MLS/HR: 0.9 INJECTION, SOLUTION INTRAVENOUS at 03:00

## 2019-02-19 RX ADMIN — AZITHROMYCIN SCH MG: 500 TABLET, FILM COATED ORAL at 10:20

## 2019-02-19 RX ADMIN — IPRATROPIUM BROMIDE AND ALBUTEROL SULFATE SCH ML: .5; 2.5 SOLUTION RESPIRATORY (INHALATION) at 10:01

## 2019-02-19 RX ADMIN — APIXABAN SCH MG: 5 TABLET, FILM COATED ORAL at 21:20

## 2019-02-19 RX ADMIN — METHYLPREDNISOLONE SODIUM SUCCINATE SCH MG: 125 INJECTION, POWDER, FOR SOLUTION INTRAMUSCULAR; INTRAVENOUS at 21:20

## 2019-02-19 RX ADMIN — NYSTATIN SCH ML: 500000 SUSPENSION ORAL at 10:15

## 2019-02-19 RX ADMIN — NYSTATIN SCH ML: 500000 SUSPENSION ORAL at 21:21

## 2019-02-19 RX ADMIN — IPRATROPIUM BROMIDE AND ALBUTEROL SULFATE SCH ML: .5; 2.5 SOLUTION RESPIRATORY (INHALATION) at 18:06

## 2019-02-19 RX ADMIN — IPRATROPIUM BROMIDE AND ALBUTEROL SULFATE SCH ML: .5; 2.5 SOLUTION RESPIRATORY (INHALATION) at 05:04

## 2019-02-19 RX ADMIN — IPRATROPIUM BROMIDE AND ALBUTEROL SULFATE SCH ML: .5; 2.5 SOLUTION RESPIRATORY (INHALATION) at 22:14

## 2019-02-19 RX ADMIN — METHYLPREDNISOLONE SODIUM SUCCINATE SCH MG: 125 INJECTION, POWDER, FOR SOLUTION INTRAMUSCULAR; INTRAVENOUS at 02:05

## 2019-02-19 RX ADMIN — NYSTATIN SCH: 500000 SUSPENSION ORAL at 21:35

## 2019-02-19 RX ADMIN — ATORVASTATIN CALCIUM SCH MG: 20 TABLET, FILM COATED ORAL at 21:20

## 2019-02-19 RX ADMIN — METHYLPREDNISOLONE SODIUM SUCCINATE SCH MG: 125 INJECTION, POWDER, FOR SOLUTION INTRAMUSCULAR; INTRAVENOUS at 10:21

## 2019-02-19 RX ADMIN — APIXABAN SCH MG: 5 TABLET, FILM COATED ORAL at 10:20

## 2019-02-19 NOTE — CT SCAN REPORT
EXAM:  NONCONTRAST CT OF THE BRAIN 



HISTORY:  DIFFICULTY BREATHING, DEHYDRATION, SHAKING.   



TECHNIQUE:  Noncontrast CT of the brain was obtained.  



Comparison:  None.  



FINDINGS:  No midline shift, mass effect, or abnormal intra or extraaxial fluid 
collection.  No cerebral edema, focal mass or intracranial hemorrhage is 
detected.  The ventricle sizes are within normal limits for patient age.  Mild 
periventricular white matter hypoattenuation, may represent chronic small 
vessel white matter change.  No evidence of a displaced calvarial fracture.  
The visualized paranasal sinuses and mastoid air cells are clear.  



IMPRESSION:  

1.  NO ACUTE INTRACRANIAL FINDINGS.  



2.  SUGGESTION OF MILD CHRONIC SMALL VESSEL WHITE MATTER CHANGES.  



JOB NUMBER:  [id number]
MTDD

## 2019-02-19 NOTE — RADIOLOGY REPORT
EXAM:  CHEST, SINGLE VIEW



HISTORY:  DIFFICULTY BREATHING.



TECHNIQUE:  A single frontal view of the chest was obtained.  



Comparison:  Chest radiograph 1/07/19.  



FINDINGS:  The cardiac silhouette is within normal size limits.  No new focal 
pulmonary opacities.  Redemonstration of left lateral costophrenic angle 
blunting.  There is coarse linear opacification in the left upper lobe which 
appears similar from the prior study.  Left apical pleural thickening.  There 
are likely some additional coarse linear opacities in both lung bases.  No 
visible pneumothorax.  



IMPRESSION:  

1.  NO DEFINITE NEW LUNG FINDINGS.  



2.  PERSISTENT BLUNTING OF THE LEFT LATERAL COSTOPHRENIC ANGLE, SIMILAR FROM 
PRIOR AND MAY REPRESENT TRACE EFFUSION OR PLEURAL THICKENING.  UNCHANGED LEFT 
APICAL PLEURAL THICKENING NOTED AS WELL.  



3.  SCATTERED COARSE LINEAR PARENCHYMAL PULMONARY OPACITIES WHICH APPEAR 
OVERALL SIMILAR FROM  1/07/19 CHEST X-RAY COMPARISON.  THESE MAY REPRESENT 
CHRONIC SCARRING/FIBROTIC CHANGES.  



JOB NUMBER:  [id number]
MTDD

## 2019-02-19 NOTE — HISTORY & PHYSICAL
History of Present Illness





- Date of Service


Date of Service for History & Physical: 02/19/19





- History of Present Illness


Admitting Diagnosis: Acute Bronchitis.  COPD exacerbation.  hypoxia.  Thrush.  

CAD.  history of A fib on elliquis


History of Present Illness: 


69 yo male was at his PCP [place] and was found SOB with hypoxia and during 

breathing treatment had some tremulous activity. Sent to [facility] ([facility] ER ambulatory. 

PMH COPD, MI with STENTs, afib with 2 ablations, smoker 1/2ppd x50 yrs with no 

desire to quit, PNA, trach with reversal and "non-epileptic spells". Pt denies 

any sick contacts, refused flu vaccination, refuses smoking cessation. States 

baseline O2 sat 90%, no home O2 and only needs rescue inhaler (alb MDI) less 

than once a week when baseline. Recent PNA was last month [facility] 





2/18/19


Pt ambulates to [facility] (formerly [facility] ER, sat 87% with reports of CAITLIN, SOB, and "feeling out if 

it". Pt reports 3-4 days of increasing SOB/CAITLIN and using alb neb tx at home. 

Denies sick contacts. Subsequent finding thrush.


WBC 8.6, hgb 15.5, Hct 48.2, Plt 161 (Bands 11)


Na 138, K 3.9, cL 98, cO2 26, BUN 17, Cr 0.9, GFR >60, Glucose  144


D Dimer .39


Trop <0.010


pCO2 45.8, pO2 72, HCO3 27, Oxyhemoglobin 93.4, pH 7.39, O2 sat 95, 

Carboxyhemoglobin 2.0


temp 98.9F, 137/93, RR20, 87% RA (placed on 3L and sat 93%)


CXR neg for acute process, comparable to previous last month


CT head neg for acute process


Alb neb tx given, azithromax 500mg, solumedrol 125mg IVP, nystatin PO, Rocephin 

1gm IVPB.





2/19/18


Pt resting in bed, no distress noted. Pt has productive cough of green purulent 

sputum. Lungs are diminished but no coarseness or wheezing noted. Pt has been 

tolerating IV ABX and PO. After review of labs, Neutrophil bands noted at 11 

and repeat shows neutrophils of 90. Continue IV rocephin and po azithromax. 

Titrate O2 to room air with sats low 90% as pt baseline is 90%. Pt agrees with 

this POC. 


Repeat labs am. 











PCP Rex Brewer ([facility])


Card Fralick ([facility])








Travel Screening





- Travel/Exposure Within Last 30 Days


Have you traveled within the last 30 days?: No





- Travel/Exposure Within Last Year


Have you traveled outside the [place] in the last year?: No





- Additonal Travel Details


Have you been exposed to anyone with a communicable illness?: No





- Travel Symptoms


Symptom Screening: Fatigue





- Additional Travel Comment


Additional Travel/Exposure Comment: Chronic





Review of Systems


Constitutional: Reports: As per HPI, Weakness.  Denies: Chills, Fever, Malaise, 

Night sweats, Weight change


Eyes: Reports: As per HPI.  Denies: Eye discharge, Eye pain, Photophobia, 

Vision change


ENT: Reports: As per HPI, Other (white spots in the throat).  Denies: Congestion

, Dental pain, Ear pain, Epistaxis, Hearing loss, Throat pain


Respiratory: Reports: As per HPI.  Denies: Cough, Dyspnea, Hemoptysis, Stridor, 

Wheezes


Cardiovascular: Reports: As per HPI.  Denies: Arrhythmia, Chest pain, Dyspnea 

on exertion, Edema, Murmurs, Orthopnea, Palpitations, Paroxysmal nocturnal 

dyspnea, Rheumatic Fever, Syncope


Endocrine: Reports: As per HPI.  Denies: Fatigue, Heat or cold intolerance, 

Polydipsia, Polyuria


Gastrointestinal: Reports: As per HPI.  Denies: Abdominal pain, Constipation, 

Diarrhea, Hematemesis, Hematochezia, Melena, Nausea, Vomiting


Genitourinary: Reports: As per HPI.  Denies: Dysuria, Frequency, Hematuria, 

Incontinence, Retention, Testicular pain, Testicular mass, Urgency


Musculoskeletal: Reports: As per HPI.  Denies: Arthralgia, Back pain, Gout, 

Joint swelling, Myalgia, Neck pain


Skin: Reports: As per HPI.  Denies: Bruising, Change in color, Change in hair/

nails, Lesions, Pruritus, Rash


Neurological: Reports: As per HPI.  Denies: Abnormal gait, Confusion, Headache, 

Numbness, Paresthesias, Seizure, Tingling, Tremors, Vertigo, Weakness


Psychiatric: Reports: As per HPI.  Denies: Anxiety, Auditory hallucinations, 

Depression, Homicidal thoughts, Suicidal thoughts, Visual hallucinations


Hematological/Lymphatic: Reports: As per HPI.  Denies: Anemia, Blood Clots, 

Easy bleeding, Easy bruising, Swollen glands





Past Medical History





- SOCIAL HISTORY


Smoking Status: Heavy tobacco smoker (>10/day)


Alcohol Use: None


Drug Use: None





- RESPIRATORY


Hx Respiratory Disorders: Yes


Hx Bronchitis: Yes


Hx COPD: Yes


Hx Pneumonia: Yes


Comment:: Emphsema





- CARDIOVASCULAR


Hx Cardio Disorders: Yes


Hx Cardiac Cath: Yes (2 stents)


Hx Chest Pain: Yes


Hx Heart Attack: Yes


Hx Irregular Heartbeat: Yes (a-fib)


Hx Palpitations: Yes


Comment:: [facility]





- NEURO


Hx Neuro Disorders: Yes


Hx Seizures: Yes ("non-epileptiform spells", last 2016)





- GI


Hx GI Disorders: No





- 


Hx Genitourinary Disorders: No





- ENDOCRINE


Hx Endocrine Disorders: No





- MUSCULOSKELETAL


Hx Musculoskeletal Disorders: Yes


Hx Back Injury: Yes (L4-5 cage)





- PSYCH


Hx Psych Problems: Yes


Hx Anxiety: Yes


Hx Depression: Yes





- HEMATOLOGY/ONCOLOGY


Hx Hematology/Oncology Disorders: No


Comment:: on blood thinners for afib





Family Medical History


Any Significant Family History?: Yes


Hx Cancer: Father





H&P Meds/Allergies





- Allergies


Allergies: 


 Allergies











Allergy/AdvReac Type Severity Reaction Status Date / Time


 


morphine AdvReac  BEHAVIORAL Verified 02/18/19 15:06





   CHANGES  














- Home Medications


 Home Medications











 Medication  Instructions  Recorded  Confirmed  Last Taken


 


Prednisone [Prednisone 20Mg] 10 mg PO DAILY 02/18/19 02/18/19 1 Day Ago





    ~02/17/19








 Previous Rx's











 Medication  Instructions  Recorded


 


Albuterol Sulfate 0.083% [Neb] 2.5 mg INH RESP.Q4H.WA 01/09/19





[Albuterol Sulfate] nebulization solution 














- Active Medications


Active Medications: 


 Current Medications





Albuterol Sulfate (Albuterol Sulfate)  2.5 mg INH RESP.Q2H PRN


   PRN Reason: DIFFICULTY IN BREATHING


Albuterol/Ipratropium (Duoneb)  3 ml INH RESP.Q4H.[facility]


   Last Admin: 02/19/19 10:01 Dose:  3 ml


Apixaban (Eliquis)  5 mg PO BID [facility]


   Last Admin: 02/18/19 22:34 Dose:  5 mg


Atorvastatin Calcium (Lipitor)  80 mg PO QHS [facility]


   Last Admin: 02/18/19 22:33 Dose:  80 mg


Azithromycin (Zithromax)  500 mg PO DAILY [facility]


Sodium Chloride ()  1,000 mls @ 100 mls/hr IV .Q10H PRN


   PRN Reason: LARGE VOLUME IV


   Last Admin: 02/19/19 03:00 Dose:  100 mls/hr


CEFTRIAXONE 1GM/50ML BAG (Ceftriaxone 1 Gm-D5w Bag)  1 gm in 50 mls @ 100 mls/

hr IVPB Q12HR [facility]


Methylprednisolone Sodium Succinate (Solu-Medrol)  60 mg IVP Q8H [facility]


   Last Admin: 02/19/19 02:05 Dose:  60 mg


Metoprolol Succinate (Toprol Xl)  25 mg PO DAILY [facility]


Nitroglycerin (Nitrostat 0.4mg)  0.4 mg SL Q5MIN PRN


   PRN Reason: CHEST PAIN


Non-Formulary Medication (Ubidecarenone [Coenzyme Q10])  30 mg PO QHS [facility]


Nystatin ()  10 ml PO QID [facility]


   Last Admin: 02/18/19 22:34 Dose:  10 ml











Physical Exam





- Vital Signs


Vital Signs: 


 Vital Signs - Last 24 Hrs











  Temp Pulse Pulse Resp BP BP Pulse Ox


 


 02/19/19 07:51    96 H  17   


 


 02/19/19 06:00  97.5 F L   96 H  17   121/67  96


 


 02/19/19 05:04   96 H   20    89 L


 


 02/18/19 22:00  97.2 F L   83  17   129/72  92 L


 


 02/18/19 21:56   80   18    92 L


 


 02/18/19 20:21    97 H  18   


 


 02/18/19 18:19   106 H   18    99


 


 02/18/19 17:50  97.5 F L   97 H  20   141/85  91 L


 


 02/18/19 17:34  97.4 F L   94 H  22   127/74  93 L


 


 02/18/19 16:34    96 H  22   114/77  93 L


 


 02/18/19 16:13    102 H  22   99/77  93 L


 


 02/18/19 14:57  98.9 F  115 H   20  137/93   87 L














- General


General Appearance: Alert, Oriented x3, Cooperative, No acute distress


Limitations: No limitations





- Head


Head exam: Normal inspection





- Eye


Eye exam: Normal appearance, PERRL


Pupils: Normal accommodation





- ENT


ENT exam: Normal exam, Mucous membranes moist, Normal external ear exam, TM's 

normal bilaterally


Ear exam: Normal external inspection.  negative: External canal tenderness


Nasal Exam: Normal inspection.  negative: Discharge, Sinus tenderness


Mouth exam: Normal external inspection


Teeth exam: Normal inspection.  negative: Dental caries


Throat exam: Other (whit spots back of throat and is thrush).  negative: 

Tonsillar erythema, Tonsillar exudate





- Neck


Neck exam: Normal inspection, Full ROM.  negative: Tenderness





- Respiratory


Respiratory exam: Decreased breath sounds.  negative: Respiratory distress





- Cardiovascular


Cardiovascular Exam: Regular rate, Normal rhythm, Normal heart sounds


Peripheral Pulses: 3+: Radial (R), Radial (L), Dorsalis Pedis (R), Dorsalis 

Pedis (L)





- GI/Abdominal


GI/Abdominal exam: Soft, Normal bowel sounds.  negative: Tenderness





- Rectal


Rectal exam: Deferred





- 


 exam: Deferred





- Extremities


Extremities exam: Normal inspection, Full ROM, Normal capillary refill.  

negative: Tenderness





- Back


Back exam: Reports: Normal inspection, Full ROM.  Denies: Muscle spasm, Rash 

noted, Tenderness





- Neurological


Neurological exam: Alert, Normal gait, Oriented X3, Reflexes normal





- Psychiatric


Psychiatric exam: Normal affect, Normal mood





- Skin


Skin exam: Dry, Intact, Normal color, Warm





Results





- Labs


Result Diagrams: 


 02/19/19 09:12





 02/18/19 15:20


Labs Last 24 Hours: 


 Laboratory Results - last 24 hr











  02/18/19 02/18/19 02/18/19





  15:20 15:20 15:20


 


WBC  8.6  


 


RBC  5.19  


 


Hgb  15.5  


 


Hct  48.2  


 


MCV  92.9  


 


MCH  29.9  


 


MCHC  32.2  


 


RDW  14.4  


 


Plt Count  161  


 


MPV  9.5  


 


Neutrophils %  63.0  


 


Band Neutrophils %  11.0 H  


 


Eosinophils %  Not Reportable  


 


Basophils %  Not Reportable  


 


Lymphocytes  19.0  


 


Monocytes  7.0  


 


Basophils  0.0  


 


Eosinophil Count  0.0  


 


APTT   35.5 


 


D-Dimer   


 


Puncture Site   


 


pCO2   


 


pO2   


 


HCO3   


 


Oxyhemoglobin   


 


ABG pH   


 


ABG O2 Saturation   


 


ABG Base Excess   


 


Linus Test   


 


Carboxyhemoglobin   


 


Methemoglobin   


 


Total Hemoglobin   


 


Actual Respiration Rate   


 


FiO2   


 


Sodium    138


 


Potassium    3.9


 


Chloride    98


 


Carbon Dioxide    26.0


 


Anion Gap    14.0


 


BUN    17


 


Creatinine    0.9


 


Estimated GFR    > 60


 


Random Glucose    144 H


 


Calcium    9.3


 


Troponin T   


 


Urine Color   


 


Urine Appearance   


 


Urine pH   


 


Ur Specific Gravity   


 


Urine Protein   


 


Urine Glucose (UA)   


 


Urine Ketones   


 


Urine Blood   


 


Urine Nitrite   


 


Urine Bilirubin   


 


Urine Urobilinogen   


 


Ur Leukocyte Esterase   


 


Influenza Type A Ag   


 


Influenza Type B Ag   














  02/18/19 02/18/19 02/18/19





  15:20 15:20 15:24


 


WBC   


 


RBC   


 


Hgb   


 


Hct   


 


MCV   


 


MCH   


 


MCHC   


 


RDW   


 


Plt Count   


 


MPV   


 


Neutrophils %   


 


Band Neutrophils %   


 


Eosinophils %   


 


Basophils %   


 


Lymphocytes   


 


Monocytes   


 


Basophils   


 


Eosinophil Count   


 


APTT   


 


D-Dimer  0.39  


 


Puncture Site   


 


pCO2   


 


pO2   


 


HCO3   


 


Oxyhemoglobin   


 


ABG pH   


 


ABG O2 Saturation   


 


ABG Base Excess   


 


Linus Test   


 


Carboxyhemoglobin   


 


Methemoglobin   


 


Total Hemoglobin   


 


Actual Respiration Rate   


 


FiO2   


 


Sodium   


 


Potassium   


 


Chloride   


 


Carbon Dioxide   


 


Anion Gap   


 


BUN   


 


Creatinine   


 


Estimated GFR   


 


Random Glucose   


 


Calcium   


 


Troponin T   < 0.010 


 


Urine Color   


 


Urine Appearance   


 


Urine pH   


 


Ur Specific Gravity   


 


Urine Protein   


 


Urine Glucose (UA)   


 


Urine Ketones   


 


Urine Blood   


 


Urine Nitrite   


 


Urine Bilirubin   


 


Urine Urobilinogen   


 


Ur Leukocyte Esterase   


 


Influenza Type A Ag    Negative


 


Influenza Type B Ag    Negative














  02/18/19 02/18/19 02/19/19





  17:08 22:14 09:12


 


WBC    6.1


 


RBC    4.75


 


Hgb    14.4


 


Hct    44.8


 


MCV    94.3


 


MCH    30.3


 


MCHC    32.1


 


RDW    14.2


 


Plt Count    160


 


MPV    9.6


 


Neutrophils %    90.0 H


 


Band Neutrophils %   


 


Eosinophils %    Not Reportable


 


Basophils %    Not Reportable


 


Lymphocytes    8.0 L


 


Monocytes    2.0


 


Basophils   


 


Eosinophil Count   


 


APTT   


 


D-Dimer   


 


Puncture Site  Right wrist  


 


pCO2  45.8  


 


pO2  72.0 L  


 


HCO3  27.0 H  


 


Oxyhemoglobin  93.4 L  


 


ABG pH  7.39  


 


ABG O2 Saturation  95.2  


 


ABG Base Excess  1.9  


 


Linus Test  Pass  


 


Carboxyhemoglobin  2.0 H  


 


Methemoglobin  0.0  


 


Total Hemoglobin  14.4  


 


Actual Respiration Rate  18.0  


 


FiO2  Not Reportable  


 


Sodium   


 


Potassium   


 


Chloride   


 


Carbon Dioxide   


 


Anion Gap   


 


BUN   


 


Creatinine   


 


Estimated GFR   


 


Random Glucose   


 


Calcium   


 


Troponin T   


 


Urine Color   Yellow 


 


Urine Appearance   Clear 


 


Urine pH   6.0 


 


Ur Specific Gravity   1.025 


 


Urine Protein   Negative 


 


Urine Glucose (UA)   Negative 


 


Urine Ketones   Negative 


 


Urine Blood   Negative 


 


Urine Nitrite   Negative 


 


Urine Bilirubin   Negative 


 


Urine Urobilinogen   1.0 


 


Ur Leukocyte Esterase   Negative 


 


Influenza Type A Ag   


 


Influenza Type B Ag   














- Imaging and Cardiology


  ** Chest x-ray


Status: Report reviewed





  ** CT scan - head


Status: Report reviewed





VTE H&P Assessment





- Risk for VTE


Risk for VTE: Yes


Risk Level: Moderate


Risk Assessment Date: 02/19/19


Risk Assessment Time: 10:44


VTE Orders Placed or Will Be Placed: No


VTE Reason for No Prophylaxis: Contraindicated (already Eliquis)





Plan





- Inpatient Certification


Inpatient Certification: 


Admit to inpatient care: Based on my medical assessment, after consideration of 

patient's risk factors (age, co-morbidities and patient presenting symptoms and 

acuity), I expect that this patient will remain in the hospital greater than or 

equal to two midnights and that the services needed warrant inpatient care 

because:





Patient Risk Factors: Hypoxia, difficulty breathing, pneumonia, weakness 





Estimated length of stay:   The patient may reasonably be expected to be 

discharged or transferred to a hospital within 96 hours after admission to 

[facility].





Services needed: IV antibiotics, supplemental oxygen, repeat labs





Post hospital care (if known): []





I certify that my determination is in accordance with my understanding of 

Medicare requirements for reasonable and necessary inpatient services.





02/19/19 10:45








- Detailed Diagnosis and Plan


(1) Bronchitis


Current Visit: Yes   Status: Acute   Base Code: J40 - BRONCHITIS, NOT SPECIFIED 

AS ACUTE OR CHRONIC   Comment: 2/19/19


-CXR neg for acute process but abnormal baseline


-productive cough, green purlurent sputum


-increased neutrophil bands


-increased need for O2   





(2) COPD exacerbation


Current Visit: Yes   Status: Acute   Base Code: J44.1 - CHRONIC OBSTRUCTIVE 

PULMONARY DISEASE W (ACUTE) EXACERBATION   Comment: 2/19/19:


-Chest xray showed LIZZ streaky infiltrates vs. chronic fibrosis (similar to 1/7/ 19 CXR)


-Pt req O2 in ER, attempting to wean to sat 90%


-Continue IV steroids, IV abx r/t increased bands on CBC


-Pt has neb and 2 boxes of albuterol solution at home (can d/c with home tx 

when improved)


   





(3) Hypoxia


Current Visit: Yes   Status: Acute   Base Code: R09.02 - HYPOXEMIA   Comment: 2/ 19/19


-Continue steroids, ABX, decrease O2 via NC to sat of 90% RA   





(4) Thrush


Current Visit: Yes   Status: Acute   Base Code: B37.0 - CANDIDAL STOMATITIS   

Comment: 2/19/19


-nystatin PO tx


-pt at risk with steroid use   





(5) Tobacco dependence due to cigarettes


Current Visit: Yes   Status: Acute   Base Code: F17.210 - NICOTINE DEPENDENCE, 

CIGARETTES, UNCOMPLICATED   Comment: 2/19/19:


-pt reports no smoking 4 days but has little desire to quit


-Highly encouraged pt to consider smoking cessation   





(6) At risk for deep venous thrombosis


Current Visit: No   Status: Acute   Base Code: Z91.89 - OTH PERSONAL RISK 

FACTORS, NOT ELSEWHERE CLASSIFIED   Comment: 2/19/19:


-on eliquis, no lovenox r/t to this


-pt has h/o Afib   





(7) Full code status


Current Visit: No   Status: Acute   Base Code: Z78.9 - OTHER SPECIFIED HEALTH 

STATUS   Comment: 2/19/19:


-pt is a full code

## 2019-02-20 LAB
ANION GAP SERPL CALC-SCNC: 10 MMOL/L (ref 7–16)
BASOPHILS NFR BLD: 0 % (ref 0–6)
BUN SERPL-MCNC: 14 MG/DL (ref 8–23)
CO2 SERPL-SCNC: 28 MMOL/L (ref 22–29)
CREAT SERPL-MCNC: 0.7 MG/DL (ref 0.7–1.2)
EOSINOPHIL NFR BLD: 0 % (ref 0–6)
ERYTHROCYTE [DISTWIDTH] IN BLOOD BY AUTOMATED COUNT: 14.1 % (ref 11.5–14.5)
EST GLOMERULAR FILTRATION RATE: > 60 ML/MIN
GLUCOSE SERPL-MCNC: 147 MG/DL (ref 74–109)
HCT VFR BLD CALC: 42.6 % (ref 42–52)
HGB BLD-MCNC: 13.6 GM/DL (ref 14–18)
LYMPHOCYTES NFR BLD: 7 % (ref 16–45)
MCH RBC QN AUTO: 30.5 PG (ref 27–33)
MCHC RBC AUTO-ENTMCNC: 31.9 G/DL (ref 32–36)
MCV RBC AUTO: 95.7 FL (ref 81–97)
MONOCYTES NFR BLD: 4 % (ref 0–9)
NEUTROPHILS NFR BLD AUTO: 88 % (ref 47–80)
NEUTS BAND NFR BLD: 1 % (ref 0–5)
PLATELET # BLD: 169 K/UL (ref 130–400)
PMV BLD AUTO: 10 FL (ref 7.4–10.4)
RBC # BLD AUTO: 4.45 M/UL (ref 4.4–5.7)
WBC # BLD AUTO: 10.5 K/UL (ref 4.2–12.2)

## 2019-02-20 RX ADMIN — SODIUM CHLORIDE PRN MLS/HR: 0.9 INJECTION, SOLUTION INTRAVENOUS at 01:30

## 2019-02-20 RX ADMIN — NYSTATIN SCH ML: 500000 SUSPENSION ORAL at 16:41

## 2019-02-20 RX ADMIN — IPRATROPIUM BROMIDE AND ALBUTEROL SULFATE SCH ML: .5; 2.5 SOLUTION RESPIRATORY (INHALATION) at 14:30

## 2019-02-20 RX ADMIN — METOPROLOL SUCCINATE SCH MG: 25 TABLET, EXTENDED RELEASE ORAL at 09:29

## 2019-02-20 RX ADMIN — CEFTRIAXONE SCH MLS/HR: 1 INJECTION, SOLUTION INTRAVENOUS at 09:28

## 2019-02-20 RX ADMIN — NYSTATIN SCH ML: 500000 SUSPENSION ORAL at 09:29

## 2019-02-20 RX ADMIN — AZITHROMYCIN SCH MG: 500 TABLET, FILM COATED ORAL at 09:29

## 2019-02-20 RX ADMIN — APIXABAN SCH MG: 5 TABLET, FILM COATED ORAL at 09:29

## 2019-02-20 RX ADMIN — METHYLPREDNISOLONE SODIUM SUCCINATE SCH MG: 125 INJECTION, POWDER, FOR SOLUTION INTRAMUSCULAR; INTRAVENOUS at 05:20

## 2019-02-20 RX ADMIN — METHYLPREDNISOLONE SODIUM SUCCINATE SCH MG: 125 INJECTION, POWDER, FOR SOLUTION INTRAMUSCULAR; INTRAVENOUS at 09:40

## 2019-02-20 RX ADMIN — IPRATROPIUM BROMIDE AND ALBUTEROL SULFATE SCH ML: .5; 2.5 SOLUTION RESPIRATORY (INHALATION) at 05:53

## 2019-02-20 RX ADMIN — IPRATROPIUM BROMIDE AND ALBUTEROL SULFATE SCH ML: .5; 2.5 SOLUTION RESPIRATORY (INHALATION) at 10:17

## 2019-02-20 RX ADMIN — Medication PRN MG: at 15:08

## 2019-02-20 NOTE — DISCHARGE SUMMARY
Providers


Discharge Summary Date: 02/20/19


Date of admission: 


02/18/19 17:34





Expected Date of Discharge: 02/20/19


Attending physician: 


AGAPITO STREETER





Primary care physician: 


TERRY HITCHCOCK D.O.





Consults: 


Consult Orders





02/19/19 16:09


Consult - Cardiology NOW 


   Consulting Provider: PATRICIA KEY


   Physician Instructions: 


   Reason For Exam: elevated trop


   Does pt have current cardiologist?: Other














Physical Exam





- Vital Signs


Vital Signs: 


 Vital Signs - Last 24 Hrs











  Temp Pulse Pulse Resp BP Pulse Ox


 


 02/20/19 10:17   88   18   89 L


 


 02/20/19 09:57  97.9 F   92 H  18  129/61  88 L


 


 02/20/19 09:00    92 H  18  


 


 02/20/19 06:00  98 F   88  17  119/70  94 L


 


 02/20/19 05:53   88   20   94 L


 


 02/20/19 02:00  97.7 F   84  16  114/65  95


 


 02/19/19 22:14   86   20   93 L


 


 02/19/19 22:00  97.7 F   71  16  115/73  93 L


 


 02/19/19 21:00    84  16  


 


 02/19/19 18:08   94 H   18   92 L


 


 02/19/19 18:00    72  16  103/63  92 L














- General


General Appearance: Alert, Oriented x3, Cooperative, No acute distress


Limitations: No limitations





- Head


Head exam: Normal inspection





- Eye


Eye exam: Normal appearance, PERRL


Pupils: Normal accommodation





- ENT


ENT exam: Normal exam, Mucous membranes moist, Normal external ear exam, TM's 

normal bilaterally


Ear exam: Normal external inspection.  negative: External canal tenderness


Nasal Exam: Normal inspection.  negative: Discharge, Sinus tenderness


Mouth exam: Normal external inspection


Teeth exam: Normal inspection.  negative: Dental caries


Throat exam: Other (whit spots back of throat and is thrush).  negative: 

Tonsillar erythema, Tonsillar exudate





- Neck


Neck exam: Normal inspection, Full ROM.  negative: Tenderness





- Respiratory


Respiratory exam: Decreased breath sounds.  negative: Respiratory distress





- Cardiovascular


Cardiovascular Exam: Regular rate, Normal rhythm, Normal heart sounds


Peripheral Pulses: 3+: Radial (R), Radial (L), Dorsalis Pedis (R), Dorsalis 

Pedis (L)





- GI/Abdominal


GI/Abdominal exam: Soft, Normal bowel sounds.  negative: Tenderness





- Rectal


Rectal exam: Deferred





- 


 exam: Deferred





- Extremities


Extremities exam: Normal inspection, Full ROM, Normal capillary refill.  

negative: Tenderness





- Back


Back exam: Reports: Normal inspection, Full ROM.  Denies: Muscle spasm, Rash 

noted, Tenderness





- Neurological


Neurological exam: Alert, Normal gait, Oriented X3, Reflexes normal





- Psychiatric


Psychiatric exam: Normal affect, Normal mood





- Skin


Skin exam: Dry, Intact, Normal color, Warm





Hospitalization





- Hospitalization


Admission Diagnosis: Acute Bronchitis.  COPD exacerbation.  hypoxia.  Thrush.  

CAD.  [email or web address]





- Problem List/Discharge Diagnosis


(1) Bronchitis


Current Visit: Yes   Status: Acute   Base Code: J40 - BRONCHITIS, NOT SPECIFIED 

AS ACUTE OR CHRONIC   Comment: 2/19/19


-CXR neg for acute process but abnormal baseline


-productive cough, green purlurent sputum


-increased neutrophil bands


-increased need for O2


2/20/19


-pt able to tolerate more physical activity, ambulating in room and hallway


-weaned off O2, sats 88-90, baseline is 90 at home with significant COPD issues


-decreased mucous production, decreased work of breathing   





(2) COPD exacerbation


Current Visit: Yes   Status: Acute   Base Code: J44.1 - CHRONIC OBSTRUCTIVE 

PULMONARY DISEASE W (ACUTE) EXACERBATION   Comment: 2/19/19:


-Chest xray showed LIZZ streaky infiltrates vs. chronic fibrosis (similar to 1/7/ 19 CXR)


-Pt req O2 in ER, attempting to wean to sat 90%


-Continue IV steroids, IV abx r/t increased bands on CBC


-Pt has neb and 2 boxes of albuterol solution at home (can d/c with home tx 

when improved)


2/20/19


-continue PO abx and 3 more days of PO steroids


-f/u PCP within the week


   





(3) Hypoxia


Current Visit: Yes   Status: Acute   Base Code: R09.02 - HYPOXEMIA   Comment: 2/ 19/19


-Continue steroids, ABX, decrease O2 via NC to sat of 90% RA


-currently on 1-2 L NC


2/20/19


-sats 88-90%RA with activity, this is pt baseline   





(4) Thrush


Current Visit: Yes   Status: Acute   Base Code: B37.0 - CANDIDAL STOMATITIS   

Comment: 2/19/19


-nystatin PO tx


-pt at risk with steroid use


2/20/19


-continue nystatin tx at home


-rinse after every use of ICS inhaler   





(5) Tobacco dependence due to cigarettes


Current Visit: Yes   Status: Acute   Base Code: F17.210 - NICOTINE DEPENDENCE, 

CIGARETTES, UNCOMPLICATED   Comment: 2/19/19:


-pt reports no smoking 4 days but has little desire to quit


-Highly encouraged pt to consider smoking cessation


2/20/19


-pt declined smoking cessation assistance   





(6) At risk for deep venous thrombosis


Current Visit: No   Status: Acute   Base Code: Z91.89 - OTH PERSONAL RISK 

FACTORS, NOT ELSEWHERE CLASSIFIED   Comment: 2/19/19:


-on eliquis, no lovenox r/t to this


-pt has h/o Afib


2/20/19


-continue eliquis home dosing   





(7) Full code status


Current Visit: No   Status: Acute   Base Code: Z78.9 - OTHER SPECIFIED HEALTH 

STATUS   Comment: 2/20/19:


-pt is a full code   





- Hospitalization Course


Disposition: Home, Self-Care


Hospital Course: 


69 yo male was at his PCP [facility] and was found SOB with hypoxia and during 

breathing treatment had some tremulous activity. Sent to [facility] ER ambulatory. 

PMH COPD, MI with STENTs, afib with 2 ablations, smoker 1/2ppd x50 yrs with no 

desire to quit, PNA, trach with reversal and "non-epileptic spells". Pt denies 

any sick contacts, refused flu vaccination, refuses smoking cessation. States 

baseline O2 sat 90%, no home O2 and only needs rescue inhaler (alb MDI) less 

than once a week when baseline. Recent PNA was last month [facility]. 





2/18/19


Pt ambulates to [facility] ER, sat 87% with reports of CAITLIN, SOB, and "feeling out if 

it". Pt reports 3-4 days of increasing SOB/CAITLIN and using alb neb tx at home. 

Denies sick contacts. Subsequent finding thrush.


WBC 8.6, hgb 15.5, Hct 48.2, Plt 161 (Bands 11)


Na 138, K 3.9, cL 98, cO2 26, BUN 17, Cr 0.9, GFR >60, Glucose  144


D Dimer .39


Trop <0.010


pCO2 45.8, pO2 72, HCO3 27, Oxyhemoglobin 93.4, pH 7.39, O2 sat 95, 

Carboxyhemoglobin 2.0


temp 98.9F, 137/93, RR20, 87% RA (placed on 3L and sat 93%)


CXR neg for acute process, comparable to previous last month


CT head neg for acute process


Alb neb tx given, azithromax 500mg, solumedrol 125mg IVP, nystatin PO, Rocephin 

1gm IVPB.





2/19/18


Pt resting in bed, no distress noted. Pt has productive cough of green purulent 

sputum. Lungs are diminished but no coarseness or wheezing noted. Pt has been 

tolerating IV ABX and PO. After review of labs, Neutrophil bands noted at 11 

and repeat shows neutrophils of 90. Continue IV rocephin and po azithromax. 

Titrate O2 to room air with sats low 90% as pt baseline is 90%. Pt agrees with 

this POC. 


Repeat labs am. 











PCP Rex Brewer ([facility])


Card Fralick ([facility])





Procedures: 


Imaging and X-Rays





02/18/19 15:21


CHEST 1 VIEW [RAD] Stat 


HEAD WO CONTRAST [CT] Stat 








Cardiology Procedures





02/18/19 15:21


Cardiac Monitor NOW 


EKG NOW 





02/19/19 11:09


EKG NOW 





02/20/19 12:45


Echocardiogram 2D - Complete NOW 











Abnormal Labs: 


 Abnormal Lab Results











  02/18/19 02/18/19 02/18/19 Range/Units





  15:20 15:20 17:08 


 


Hgb     (14.0-18.0)  gm/dl


 


MCHC     (32-36)  g/dl


 


Neutrophils %     (47-80)  %


 


Band Neutrophils %  11.0 H    (0-5)  %


 


Lymphocytes     (16-45)  %


 


pO2    72.0 L  ()  mmHg


 


HCO3    27.0 H  (18-23)  mmol/L


 


Oxyhemoglobin    93.4 L  (94-99)  % vol


 


Carboxyhemoglobin    2.0 H  (0-1.5)  %


 


Sodium     (136-145)  mmol/L


 


Chloride     ()  mmol/L


 


Random Glucose   144 H   ()  mg/dL


 


Calcium     (8.8-10.2)  mg/dL


 


Troponin T     (0-0.010)  ng/mL














  02/19/19 02/19/19 02/20/19 Range/Units





  09:12 09:12 06:14 


 


Hgb    13.6 L  (14.0-18.0)  gm/dl


 


MCHC    31.9 L  (32-36)  g/dl


 


Neutrophils %   90.0 H  88.0 H  (47-80)  %


 


Band Neutrophils %     (0-5)  %


 


Lymphocytes   8.0 L  7.0 L  (16-45)  %


 


pO2     ()  mmHg


 


HCO3     (18-23)  mmol/L


 


Oxyhemoglobin     (94-99)  % vol


 


Carboxyhemoglobin     (0-1.5)  %


 


Sodium     (136-145)  mmol/L


 


Chloride     ()  mmol/L


 


Random Glucose     ()  mg/dL


 


Calcium     (8.8-10.2)  mg/dL


 


Troponin T  < 0.05 H    (0-0.010)  ng/mL














  02/20/19 Range/Units





  06:14 


 


Hgb   (14.0-18.0)  gm/dl


 


MCHC   (32-36)  g/dl


 


Neutrophils %   (47-80)  %


 


Band Neutrophils %   (0-5)  %


 


Lymphocytes   (16-45)  %


 


pO2   ()  mmHg


 


HCO3   (18-23)  mmol/L


 


Oxyhemoglobin   (94-99)  % vol


 


Carboxyhemoglobin   (0-1.5)  %


 


Sodium  146 H  (136-145)  mmol/L


 


Chloride  108 H  ()  mmol/L


 


Random Glucose  147 H  ()  mg/dL


 


Calcium  8.4 L  (8.8-10.2)  mg/dL


 


Troponin T   (0-0.010)  ng/mL











Condition at Discharge: (2) Stable





Discharge Medications





- Discharge Medications


Prescriptions: 


Azithromycin [Zithromax] 500 mg PO DAILY 3 Days #3 tab


Home Medications: 


 Ambulatory Orders





Alendronate Sodium [Fosamax] 70 mg PO WEEKLY 01/07/19 [Last Taken 1 Day Ago ~02/ 17/19]


Apixaban [Eliquis] 5 mg PO BID 01/07/19 [Last Taken 1 Day Ago ~02/17/19]


Atorvastatin Calcium 80 mg PO QHS 01/07/19 [Last Taken 1 Day Ago ~02/17/19]


Budesonide/Formoterol Fumarate [Symbicort 160-4.5 Mcg Inhaler] 2 inh IH BID 01/ 07/19 [Last Taken 1 Day Ago ~02/17/19]


Metoprolol Succinate 25 mg PO DAILY 01/07/19 [Last Taken 1 Day Ago ~02/17/19]


Nitroglycerin 0.4MG [Nitrostat 0.4MG] 1 tab SL Q5MIN PRN 01/07/19 [Last Taken 1 

Day Ago ~02/17/19]


Tiotropium Bromide [Spiriva] 18 mcg IH DAILY 01/07/19 [Last Taken 1 Day Ago ~02/ 17/19]


Ubidecarenone [Coenzyme Q10] 30 mg PO QHS 01/07/19 [Last Taken 1 Day Ago ~02/17/ 19]


Albuterol Sulfate 0.083% [Neb] [Albuterol Sulfate] 2.5 mg INH RESP.Q4H.WA  

nebulization solution 01/09/19 [Last Taken 1 Day Ago ~02/17/19]


Prednisone [Prednisone 20Mg] 10 mg PO DAILY 02/18/19 [Last Taken 1 Day Ago ~02/ 17/19]


Albuterol Sulfate 0.083% [Neb] [Albuterol Sulfate] 2.5 mg INH RESP.Q2H PRN  

nebulization solution 02/20/19 [Last Taken Unknown]


Azithromycin [Zithromax] 500 mg PO DAILY 3 Days #3 tab 02/20/19 [Last Taken 

Unknown]


Calcium Carbonate [Tums] 500 mg PO Q4H PRN  tab.chew 02/20/19 [Last Taken 

Unknown]


Nystatin 5 ml PO QID 10 Days #200 ml 02/20/19 [Last Taken Unknown]











Discharge Plan





- Discharge Instructions


Instructions:  How to Stop Smoking (DC), Dehydration (DC), Cigarette Smoking 

and Your Health (GEN), COPD (Chronic Obstructive Pulmonary Disease) (DC), 

Hypoxia (GEN)


Additional Instructions: 


PCP appt Dr. Cummins Feb 25th 4pm


please contact [facility], update them on the recent admission and ask 

for follow up. 


Next appointment with [facility] March 28th, 2019.





Quality Measures





- Quality Measures


Quality Measures: Advance Directives, Documentation of Current Medications in 

Medical Record, Elder Maltreatment Screen and Follow-Up Plan, Screening for 

High Blood Pressure and F/U Documented





- Current Medications


Quality Measure: Measure #130: Documentation of Current Medications


Documentation of Current Medications: <Current Medications Documented/Reviewed> 

[]





- Blood Pressure Screening


Quality Measure: Screening for High Blood Pressure and Follow-Up Documented


Does Patient Have Any of the Following: Active Dx of HTN


Blood Pressure Classification: Hypertensive Reading


Systolic Measurement: 137


Diastolic Measurement: 93


Screening for High Blood Pressure: Patient Exclusion, Hx of HTN []





- Advance Directives


Quality Measure: Measure #47: Care Plan


Advance Directives Established: No


Advance Directives Information Provided To Patient: Declined


Advance Directives on File: No


Living Will: No


Power of : No


Advance Care Planning: <Care Plan/Decision Maker Not Decided; Discussed & 

Documented> [1324F]





- Elder Abuse Suspicion Index


Screening: Elder Abuse Suspicion Index Screening


Rely on people for bathing, dressing, shopping, banking, etc: No


Prevented from getting food, clothes, medication, etc: No


Made to feel shamed or threatened by someone: No


Forced to sign papers or use money against will: No


Feel afraid, touched in ways not wanted or hurt physically: No


Poor eye contact, withdrawn, malnourished, cuts or bruises: No


Screening Result: Negative result


EASI Reference Information: Thierno SALDIVAR, Barney C, Darling D, Wade EATON.Development and validation of a tool to assist physicians identification of 

elder abuse: The Elder Abuse Suspicion  Index (EASI ).  Journal of Elder Abuse 

and Neglect, 2008; 20 (3): 276-300.





- Elder Maltreatment Screen


Quality Measures: Elder Maltreatment Screen and Follow-Up Plan


Elder Maltreatment Screen: <Negative, No Follow-Up Plan Required> []

## 2019-02-20 NOTE — CARDIOLOGY CONSULT
DATE: 02/20/2019



CONSULTING PHYSICIAN: PATRICIA KEY M.D.



REASON FOR CONSULTATION: ELEVATED TROPONIN.



Mr. Hsusein is 68 years old, who presented to [facility] on 02/
18/2019 with complaints of shortness of breath. He had been complaining of 
progressive shortness of breath for the past two weeks prior to presentation. 
He denied fever, chills, angina, palpitations, TIA, syncope, or congestive 
heart failure symptoms. He does have history of coronary artery disease with 
stenting performed greater than five years ago at [facility]. He has 
history of paroxysmal atrial fibrillation and underwent two ablations performed 
at the [facility] approximately three years ago. He has remained in 
sinus rhythm since. He continues to smoke a half-pack a day and has been 
smoking for 50 years. He has no intention to quit. He is on chronic steroids 
for chronic obstructive pulmonary disease. He is currently being treated for 
acute bronchitis and notes improvement in his breathing. He is seeing a 
Cardiologist at the [facility] on a yearly basis.



PAST MEDICAL HISTORY: 

1. Coronary artery disease.

2. Paroxysmal atrial fibrillation status post ablation x2.

3. COPD.

4. Acute on chronic bronchitis.



PAST SURGICAL HISTORY INCLUDES: Back surgery, bilateral knee surgery, bilateral 
shoulder surgery, and surgery for pneumothorax due to emphysema. 



MEDICATIONS CURRENTLY INCLUDE: 

Apixaban 5 mg b.i.d.

Atorvastatin 80 mg every h.s.

Albuterol as directed

Prednisone 10 mg daily

Metoprolol Succinate 25 mg daily



ALLERGIES: NONE. 



SOCIAL HISTORY: He is retired due to his medical history. 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 
. He has greater than 25 pack-year smoking history. No significant 
alcohol use. 



FAMILY HISTORY: Noncontributory.



PHYSICAL EXAM: 

GENERAL: He is afebrile. 

VITAL SIGNS: Pulse is 96 beats per minute. Respirations 17. Blood pressure is 
121/67. He is 96% on room air.

LUNGS: Clear.

CARDIAC EXAM: Normal.

ABDOMEN: Soft.

EXTREMITIES: No edema.  



LABORATORY PROFILE: His white count is 8.6. Hemoglobin 15.5. Platelets 161,000. 
Sodium 138. Potassium 3.9. BUN 17. Creatinine 0.9. Troponins; the first one was 
0.05 and the second one was less than 0.10 and the third one was less than 
0.10. 



ECG: ECG demonstrated sinus rhythm with no significant ST/T changes.



IMPRESSION/PLAN:



Mr. Hussein has no evidence for non-ST elevation MI. He presents with shortness 
of breath that is likely due to underlying acute on chronic bronchitis. An 
echocardiogram will be performed to rule out any significant abnormalities. If 
his ejection fraction and wall motion are normal, then he can be treated and 
discharged for outpatient follow-up with his Cardiologist at the [facility]. He appears to be on reasonable guideline-directed medical therapy for 
paroxysmal atrial fibrillation and coronary artery disease.  



JOB NUMBER:  [id number]
[facility]

## 2019-03-14 ENCOUNTER — HOSPITAL ENCOUNTER (OUTPATIENT)
Dept: HOSPITAL 59 - ER | Age: 69
Setting detail: OBSERVATION
LOS: 1 days | Discharge: HOME | End: 2019-03-15
Attending: INTERNAL MEDICINE | Admitting: INTERNAL MEDICINE
Payer: COMMERCIAL

## 2019-03-14 DIAGNOSIS — I48.91: ICD-10-CM

## 2019-03-14 DIAGNOSIS — J44.1: Primary | ICD-10-CM

## 2019-03-14 DIAGNOSIS — F17.210: ICD-10-CM

## 2019-03-14 DIAGNOSIS — J43.9: ICD-10-CM

## 2019-03-14 DIAGNOSIS — Z79.01: ICD-10-CM

## 2019-03-14 DIAGNOSIS — I25.2: ICD-10-CM

## 2019-03-14 DIAGNOSIS — R09.02: ICD-10-CM

## 2019-03-14 DIAGNOSIS — Z95.5: ICD-10-CM

## 2019-03-14 LAB
ALBUMIN SERPL-MCNC: 4.2 G/DL (ref 4–5)
ALBUMIN/GLOB SERPL: 1.4 {RATIO} (ref 1.1–1.8)
ALP SERPL-CCNC: 72 U/L (ref 40–129)
ALT SERPL-CCNC: 26 U/L (ref ?–41)
ANION GAP SERPL CALC-SCNC: 14 MMOL/L (ref 7–16)
AST SERPL-CCNC: 36 U/L (ref 10–50)
BASOPHILS NFR BLD: 0.1 % (ref 0–6)
BILIRUB SERPL-MCNC: 0.6 MG/DL (ref 0.2–1)
BUN SERPL-MCNC: 26 MG/DL (ref 8–23)
CO2 SERPL-SCNC: 25 MMOL/L (ref 22–29)
CREAT SERPL-MCNC: 1.2 MG/DL (ref 0.7–1.2)
EOSINOPHIL NFR BLD: 0.4 % (ref 0–6)
ERYTHROCYTE [DISTWIDTH] IN BLOOD BY AUTOMATED COUNT: 15 % (ref 11.5–14.5)
EST GLOMERULAR FILTRATION RATE: > 60 ML/MIN
GLOBULIN SER-MCNC: 3.1 GM/DL (ref 1.4–4.8)
GLUCOSE SERPL-MCNC: 132 MG/DL (ref 74–109)
GRANULOCYTES NFR BLD: 68.6 % (ref 47–80)
HCT VFR BLD CALC: 45.9 % (ref 42–52)
HGB BLD-MCNC: 15.2 GM/DL (ref 14–18)
LYMPHOCYTES NFR BLD AUTO: 21.4 % (ref 16–45)
MCH RBC QN AUTO: 30.6 PG (ref 27–33)
MCHC RBC AUTO-ENTMCNC: 33.1 G/DL (ref 32–36)
MCV RBC AUTO: 92.5 FL (ref 81–97)
MONOCYTES NFR BLD: 9.5 % (ref 0–9)
PLATELET # BLD: 130 K/UL (ref 130–400)
PMV BLD AUTO: 9.7 FL (ref 7.4–10.4)
PROT SERPL-MCNC: 7.3 G/DL (ref 6.6–8.7)
RBC # BLD AUTO: 4.96 M/UL (ref 4.4–5.7)
WBC # BLD AUTO: 7.2 K/UL (ref 4.2–12.2)

## 2019-03-14 PROCEDURE — 80053 COMPREHEN METABOLIC PANEL: CPT

## 2019-03-14 PROCEDURE — 96374 THER/PROPH/DIAG INJ IV PUSH: CPT

## 2019-03-14 PROCEDURE — 85027 COMPLETE CBC AUTOMATED: CPT

## 2019-03-14 PROCEDURE — 93010 ELECTROCARDIOGRAM REPORT: CPT

## 2019-03-14 PROCEDURE — 94760 N-INVAS EAR/PLS OXIMETRY 1: CPT

## 2019-03-14 PROCEDURE — 94761 N-INVAS EAR/PLS OXIMETRY MLT: CPT

## 2019-03-14 PROCEDURE — 99220: CPT

## 2019-03-14 PROCEDURE — 93005 ELECTROCARDIOGRAM TRACING: CPT

## 2019-03-14 PROCEDURE — 84484 ASSAY OF TROPONIN QUANT: CPT

## 2019-03-14 PROCEDURE — 85025 COMPLETE CBC W/AUTO DIFF WBC: CPT

## 2019-03-14 PROCEDURE — 94640 AIRWAY INHALATION TREATMENT: CPT

## 2019-03-14 PROCEDURE — 99285 EMERGENCY DEPT VISIT HI MDM: CPT

## 2019-03-14 PROCEDURE — 94618 PULMONARY STRESS TESTING: CPT

## 2019-03-14 PROCEDURE — 71046 X-RAY EXAM CHEST 2 VIEWS: CPT

## 2019-03-14 RX ADMIN — SODIUM CHLORIDE PRN MLS/HR: 0.9 INJECTION, SOLUTION INTRAVENOUS at 21:13

## 2019-03-14 RX ADMIN — APIXABAN SCH: 5 TABLET, FILM COATED ORAL at 21:14

## 2019-03-14 RX ADMIN — IPRATROPIUM BROMIDE AND ALBUTEROL SULFATE SCH: .5; 2.5 SOLUTION RESPIRATORY (INHALATION) at 22:01

## 2019-03-14 RX ADMIN — DOXYCYCLINE HYCLATE SCH: 100 CAPSULE ORAL at 21:16

## 2019-03-14 RX ADMIN — NYSTATIN SCH: 500000 SUSPENSION ORAL at 21:16

## 2019-03-14 NOTE — EMERGENCY DEPARTMENT RECORD
History of Present Illness





- General


Chief Complaint: Difficulty Breathing


Stated Complaint: SHORTNESS OF BREATH


Time Seen by Provider: 19 17:48


Source: Patient


Mode of Arrival: Ambulatory


Limitations: No limitations





- History of Present Illness


Initial Comments: 





67 yo male presents to ED for evaluation of increasing SOB over the past 

several days, reports that she saw her PCP and was noted to be hypoxic in the 

office this afternoon.  Patient denies fevers, chills, or recent illness.  

Patient reports a history of COPD with recent admission for similar symptoms.  

Patient denies chest discomfort or pain symptoms.


MD Complaint: Shortness of breath


Onset/Timin


-: Days(s)


Severity: Moderate


Severity scale (1-10): 1


Quality: Aching


Worsens With: Exertion


Known History Of: COPD


Treatments Prior to Arrival: Bronchodilator





- Related Data


Home Oxygen Therapy: No


 Home Medications











 Medication  Instructions  Recorded  Confirmed  Last Taken


 


Doxycycline Hyclate [Vibramycin] 100 mg PO BID 19 1 Day Ago





    ~19








 Previous Rx's











 Medication  Instructions  Recorded


 


Albuterol Sulfate 0.083% [Neb] 2.5 mg INH RESP.Q4H.WA 19





[Albuterol Sulfate] nebulization solution 


 


Albuterol Sulfate 0.083% [Neb] 2.5 mg INH RESP.Q2H PRN 19





[Albuterol Sulfate] nebulization solution 


 


Calcium Carbonate [Tums] 500 mg PO Q4H PRN  tab.chew 19


 


Nystatin 5 ml PO QID 10 Days #200 ml 19











 Allergies











Allergy/AdvReac Type Severity Reaction Status Date / Time


 


morphine AdvReac  BEHAVIORAL Verified 19 17:59





   CHANGES  














Travel Screening





- Travel/Exposure Within Last 30 Days


Have you traveled within the last 30 days?: No





- Travel/Exposure Within Last Year


Have you traveled outside the [place] in the last year?: No





- Additonal Travel Details


Have you been exposed to anyone with a communicable illness?: No





- Travel Symptoms


Symptom Screening: None





Review of Systems


Constitutional: Denies: Chills, Fever, Malaise, Night sweats


Eyes: Denies: Eye discharge, Eye pain


ENT: Denies: Congestion, Ear pain, Epistaxis


Respiratory: Reports: Dyspnea, Wheezes.  Denies: Cough


Cardiovascular: Denies: Chest pain, Dyspnea on exertion


Endocrine: Denies: Fatigue, Heat or cold intolerance


Gastrointestinal: Denies: Abdominal pain, Nausea, Vomiting


Genitourinary: Denies: Incontinence, Retention


Musculoskeletal: Denies: Arthralgia, Back pain


Skin: Denies: Bruising, Change in color


Neurological: Denies: Abnormal gait, Confusion, Headache, Seizure


Psychiatric: Denies: Anxiety


Hematological/Lymphatic: Denies: Anemia, Blood Clots





Past Medical History





- SOCIAL HISTORY


Smoking Status: Heavy tobacco smoker (>10/day)


Alcohol Use: None


Drug Use: None





- RESPIRATORY


Hx Respiratory Disorders: Yes


Hx Bronchitis: Yes


Hx COPD: Yes


Hx Pneumonia: Yes


Comment:: Emphsema





- CARDIOVASCULAR


Hx Cardio Disorders: Yes


Hx Cardiac Cath: Yes (2 stents)


Hx Chest Pain: Yes


Hx Heart Attack: Yes


Hx Irregular Heartbeat: Yes (a-fib)


Hx Palpitations: Yes


Comment:: [facility]





- NEURO


Hx Neuro Disorders: Yes


Hx Seizures: Yes ("non-epileptiform spells", last 2016)





- GI


Hx GI Disorders: No





- 


Hx Genitourinary Disorders: No





- ENDOCRINE


Hx Endocrine Disorders: No





- MUSCULOSKELETAL


Hx Musculoskeletal Disorders: Yes


Hx Back Injury: Yes (L4-5 cage)





- PSYCH


Hx Psych Problems: Yes


Hx Anxiety: Yes


Hx Depression: Yes





- HEMATOLOGY/ONCOLOGY


Hx Hematology/Oncology Disorders: No


Comment:: on blood thinners for afib





Family Medical History


Any Significant Family History?: Yes


Hx Cancer: Father





Physical Exam





- General


General Appearance: Alert, Oriented x3, Cooperative, Mild distress


Limitations: No limitations





- Head


Head exam: Atraumatic, Normocephalic, Normal inspection


Head exam detail: negative: Abrasion, Contusion, Bautista's sign, General 

tenderness, Hematoma, Laceration





- Eye


Eye exam: Normal appearance.  negative: Conjunctival injection, Periorbital 

swelling, Periorbital tenderness, Scleral icterus





- ENT


Ear exam: negative: Auricular hematoma, Auricular trauma


Nasal Exam: negative: Active bleeding, Discharge, Dried blood, Foreign body


Mouth exam: negative: Drooling, Laceration, Muffled voice, Tongue elevation





- Neck


Neck exam: Normal inspection.  negative: Meningismus, Tenderness





- Respiratory


Respiratory exam: Decreased breath sounds, Prolonged expiratory, Wheezes.  

negative: Rales, Respiratory distress, Rhonchi





- Cardiovascular


Cardiovascular Exam: Regular rate, Normal rhythm, Normal heart sounds





- GI/Abdominal


GI/Abdominal exam: Soft.  negative: Rebound, Rigid, Tenderness





- Rectal


Rectal exam: Deferred





- 


 exam: Deferred





- Extremities


Extremities exam: Normal inspection.  negative: Pedal edema, Tenderness





- Back


Back exam: Denies: CVA tenderness (R), CVA tenderness (L)





- Neurological


Neurological exam: Alert, Normal gait, Oriented X3





- Psychiatric


Psychiatric exam: Normal affect, Normal mood





- Skin


Skin exam: Normal color.  negative: Abrasion


Type of lesion: negative: abrasion





Course





 Vital Signs











  19





  17:48


 


Temperature 98.6 F


 


Pulse Rate 92 H


 


Respiratory 22





Rate 


 


Blood Pressure 103/73


 


Pulse Ox 88 L














- Reevaluation(s)


Reevaluation #1: 





19 18:26


EKG: NSR 83


Normal axis, normal intervals


No acute ST-T wave changes


Reevaluation #2: 





19 18:50


Laboratory studies were reviewed and are grossly unremarkable for an acute 

process.


CXR:


Hyperinflation


Persistent density LLL, probable fibrosis.





Patient and his wife were updated on all results, will admit for further 

evaluation.





Medical Decision Making





- Lab Data


Result diagrams: 


 19 18:06





 19 18:06





Disposition


Disposition: Admit


Clinical Impression: 


 COPD exacerbation, Hypoxia





Disposition: Still a Patient at [facility]


Decision to Admit: Admit from ER


Decision to Admit Date: 19


Decision to Admit Time: 18:52


Condition: (2) Stable


Forms:  Patient Portal Access


Time of Disposition: 18:52





Quality





- Quality Measures


Quality Measures: N/A





- Blood Pressure Screening


Does Patient Have Any of the Following: No


Blood Pressure Classification: Normal BP Reading


Systolic Measurement: 103


Diastolic Measurement: 73


Screening for High Blood Pressure: < Normal BP, F/U Not Required > []

## 2019-03-15 LAB
ALBUMIN SERPL-MCNC: 3.8 G/DL (ref 4–5)
ALBUMIN/GLOB SERPL: 1.3 {RATIO} (ref 1.1–1.8)
ALP SERPL-CCNC: 65 U/L (ref 40–129)
ALT SERPL-CCNC: 25 U/L (ref ?–41)
ANION GAP SERPL CALC-SCNC: 13 MMOL/L (ref 7–16)
AST SERPL-CCNC: 33 U/L (ref 10–50)
BASOPHILS NFR BLD: 0 % (ref 0–6)
BILIRUB SERPL-MCNC: 0.4 MG/DL (ref 0.2–1)
BUN SERPL-MCNC: 28 MG/DL (ref 8–23)
CO2 SERPL-SCNC: 27 MMOL/L (ref 22–29)
CREAT SERPL-MCNC: 1.1 MG/DL (ref 0.7–1.2)
EOSINOPHIL NFR BLD: 0.4 % (ref 0–6)
ERYTHROCYTE [DISTWIDTH] IN BLOOD BY AUTOMATED COUNT: 14.7 % (ref 11.5–14.5)
EST GLOMERULAR FILTRATION RATE: > 60 ML/MIN
GLOBULIN SER-MCNC: 2.9 GM/DL (ref 1.4–4.8)
GLUCOSE SERPL-MCNC: 182 MG/DL (ref 74–109)
GRANULOCYTES NFR BLD: 74.9 % (ref 47–80)
HCT VFR BLD CALC: 46.7 % (ref 42–52)
HGB BLD-MCNC: 15.2 GM/DL (ref 14–18)
LYMPHOCYTES NFR BLD AUTO: 21.5 % (ref 16–45)
LYMPHOCYTES NFR BLD: 22 % (ref 16–45)
MCH RBC QN AUTO: 30.5 PG (ref 27–33)
MCHC RBC AUTO-ENTMCNC: 32.5 G/DL (ref 32–36)
MCV RBC AUTO: 93.8 FL (ref 81–97)
MONOCYTES NFR BLD: 3 % (ref 0–9)
MONOCYTES NFR BLD: 3.2 % (ref 0–9)
NEUTROPHILS NFR BLD AUTO: 75 % (ref 47–80)
PLATELET # BLD: 120 K/UL (ref 130–400)
PMV BLD AUTO: 9.9 FL (ref 7.4–10.4)
PROT SERPL-MCNC: 6.7 G/DL (ref 6.6–8.7)
RBC # BLD AUTO: 4.98 M/UL (ref 4.4–5.7)
WBC # BLD AUTO: 4.7 K/UL (ref 4.2–12.2)

## 2019-03-15 RX ADMIN — IPRATROPIUM BROMIDE AND ALBUTEROL SULFATE SCH ML: .5; 2.5 SOLUTION RESPIRATORY (INHALATION) at 09:59

## 2019-03-15 RX ADMIN — NYSTATIN SCH: 500000 SUSPENSION ORAL at 16:32

## 2019-03-15 RX ADMIN — SODIUM CHLORIDE PRN MLS/HR: 0.9 INJECTION, SOLUTION INTRAVENOUS at 08:11

## 2019-03-15 RX ADMIN — APIXABAN SCH: 5 TABLET, FILM COATED ORAL at 10:16

## 2019-03-15 RX ADMIN — IPRATROPIUM BROMIDE AND ALBUTEROL SULFATE SCH ML: .5; 2.5 SOLUTION RESPIRATORY (INHALATION) at 14:08

## 2019-03-15 RX ADMIN — NYSTATIN SCH ML: 500000 SUSPENSION ORAL at 10:16

## 2019-03-15 RX ADMIN — IPRATROPIUM BROMIDE AND ALBUTEROL SULFATE SCH ML: .5; 2.5 SOLUTION RESPIRATORY (INHALATION) at 05:30

## 2019-03-15 RX ADMIN — DOXYCYCLINE HYCLATE SCH MG: 100 CAPSULE ORAL at 10:17

## 2019-03-15 NOTE — HISTORY & PHYSICAL
History of Present Illness





- Date of Service


Date of Service for History & Physical: 03/15/19





- History of Present Illness


Admitting Diagnosis: COPD exacerbation.  Hypoxia


History of Present Illness: 





Pierre Hussein is a 68 y.o.  M who presented to the [facility] ED d/t 

increasing SOB over last several days with oxygen saturations around 88% on RA. 

Did see PCP, Dr. Goodman, in office the previous day and was started on 

Doxycycline and daily dose of Prednisone was increased to 10mg. Has COPD with 

an admission in January 2019 and February 2019 for similar symptoms. Follows 

with a lung doctor at [facility] for which he has a f/u on 3/28/19. 





ED Course:


EKG: NSR, rate 83, normal axis, no acute ST-T wave changes


CXR: Hyperinflation and persistent density LLL, probable fibrosis


CMP and CBC unremarkable





3/15/19 1545:





Vitals: 104/61  16  96% on RA





Lying flat in bed, with no oxygen supplementation. Wife at bedside. Reports 

that he feels much less SOB than the previous evening. States that he has been 

off of oxygen for over an hour. Has been up to the bathroom without O2 without 

much difficulty. Does not have O2 at home but does report that he used to but 

insurance wouldn't pay for it anymore because he had improved. Normally, he 

states his O2 sat is around 90-93% on RA. Feels that he is back to baseline and 

would like to return home. Has Doxycyline and Prednisone as ordered from PCP 

but would like to have home O2 to use for the next few weeks. Feels that if he 

had O2 at home last evening, he likely wouldn't have come to the ED.





Travel Screening





- Travel/Exposure Within Last 30 Days


Have you traveled within the last 30 days?: No





- Travel/Exposure Within Last Year


Have you traveled outside the [place] in the last year?: No





- Additonal Travel Details


Have you been exposed to anyone with a communicable illness?: No





- Travel Symptoms


Symptom Screening: None





Review of Systems


Reviewed: No additional complaints except as noted below


Constitutional: Denies: Chills, Fever, Malaise, Night sweats


Eyes: Denies: Eye discharge, Eye pain


ENT: Denies: Congestion, Ear pain, Epistaxis


Respiratory: Reports: Dyspnea, Wheezes.  Denies: Cough


Cardiovascular: Denies: Chest pain, Dyspnea on exertion


Endocrine: Denies: Fatigue, Heat or cold intolerance


Gastrointestinal: Denies: Abdominal pain, Nausea, Vomiting


Genitourinary: Denies: Incontinence, Retention


Musculoskeletal: Denies: Arthralgia, Back pain


Skin: Denies: Bruising, Change in color


Neurological: Denies: Abnormal gait, Confusion, Headache, Seizure


Psychiatric: Denies: Anxiety


Hematological/Lymphatic: Denies: Anemia, Blood Clots





Past Medical History





- SOCIAL HISTORY


Smoking Status: Light tobacco smoker (<10/day)


Alcohol Use: None


Drug Use: None





- RESPIRATORY


Hx Respiratory Disorders: Yes


Hx Bronchitis: Yes


Hx COPD: Yes


Hx Pneumonia: Yes


Comment:: Emphsema





- CARDIOVASCULAR


Hx Cardio Disorders: Yes


Hx Cardiac Cath: Yes (2 stents)


Hx Chest Pain: Yes


Hx Heart Attack: Yes


Hx Irregular Heartbeat: Yes (a-fib)


Hx Palpitations: Yes


Comment:: [facility]





- NEURO


Hx Neuro Disorders: Yes


Hx Seizures: Yes ("non-epileptiform spells", last 2016)





- GI


Hx GI Disorders: No





- 


Hx Genitourinary Disorders: No





- ENDOCRINE


Hx Endocrine Disorders: No





- MUSCULOSKELETAL


Hx Musculoskeletal Disorders: Yes


Hx Back Injury: Yes (L4-5 cage)





- PSYCH


Hx Psych Problems: Yes


Hx Anxiety: Yes


Hx Depression: Yes





- HEMATOLOGY/ONCOLOGY


Hx Hematology/Oncology Disorders: No


Comment:: on blood thinners for afib which is resolved





Family Medical History


Any Significant Family History?: Yes


Hx Cancer: Father





H&P Meds/Allergies





- Allergies


Allergies: 


 Allergies











Allergy/AdvReac Type Severity Reaction Status Date / Time


 


morphine AdvReac  BEHAVIORAL Verified 03/14/19 17:59





   CHANGES  














- Home Medications


 Home Medications











 Medication  Instructions  Recorded  Confirmed  Last Taken


 


Doxycycline Hyclate [Vibramycin] 100 mg PO BID 03/14/19 03/14/19 1 Day Ago





    ~03/13/19








 Previous Rx's











 Medication  Instructions  Recorded


 


Albuterol Sulfate 0.083% [Neb] 2.5 mg INH RESP.Q4H.WA 01/09/19





[Albuterol Sulfate] nebulization solution 


 


Albuterol Sulfate 0.083% [Neb] 2.5 mg INH RESP.Q2H PRN 02/20/19





[Albuterol Sulfate] nebulization solution 


 


Calcium Carbonate [Tums] 500 mg PO Q4H PRN  tab.chew 02/20/19


 


Acetaminophen [Tylenol 500Mg Tab] 1,000 mg PO Q6H PRN  tablet 03/15/19


 


Albuterol Sulfate 0.083% [Neb] 2.5 mg INH RESP.Q2H PRN 03/15/19





[Albuterol Sulfate] nebulization solution 


 


Doxycycline Hyclate [Vibramycin] 100 mg PO BID  capsule 03/15/19


 


Metoprolol Succinate [Toprol Xl] 25 mg PO DAILY  tab.er.24h 03/15/19














- Active Medications


Active Medications: 


 Current Medications





Acetaminophen (Tylenol 500mg Tab)  1,000 mg PO Q6H PRN


   PRN Reason: PAIN - MILD(1-4)/FEVER


Albuterol Sulfate (Albuterol Sulfate)  2.5 mg INH RESP.Q2H PRN


   PRN Reason: DIFFICULTY IN BREATHING


Albuterol/Ipratropium (Duoneb)  3 ml INH RESP.Q4H.WA [facility]


   Last Admin: 03/15/19 14:08 Dose:  3 ml


Apixaban (Eliquis)  5 mg PO BID [facility]


   Last Admin: 03/15/19 10:16 Dose:  Not Given


Atorvastatin Calcium (Lipitor)  80 mg PO QHS [facility]


   Last Admin: 03/14/19 21:15 Dose:  Not Given


Doxycycline Hyclate (Vibramycin)  100 mg PO BID [facility]


   Last Admin: 03/15/19 10:17 Dose:  100 mg


Sodium Chloride ()  1,000 mls @ 100 mls/hr IV .Q10H PRN


   PRN Reason: LARGE VOLUME IV


   Last Admin: 03/15/19 08:11 Dose:  100 mls/hr


Methylprednisolone Sodium Succinate (Solu-Medrol)  125 mg IVP DAILY [facility]


   Last Admin: 03/15/19 10:16 Dose:  125 mg


Metoprolol Succinate (Toprol Xl)  25 mg PO DAILY [facility]


   Last Admin: 03/15/19 10:17 Dose:  Not Given


Nystatin ()  5 ml PO QID [facility]


   Last Admin: 03/15/19 16:32 Dose:  Not Given


Patient Own Med: (Symbicort 160/4.5)  2 each INH BID [facility]


   Last Admin: 03/15/19 09:59 Dose:  Not Given











Physical Exam





- Vital Signs


Vital Signs: 


 Vital Signs - Last 24 Hrs











  Temp Pulse Pulse Pulse Resp BP BP


 


 03/15/19 15:17      16   104/61


 


 03/15/19 14:40       


 


 03/15/19 14:09   90    18  


 


 03/15/19 13:58  97.3 F L    83  16   87/58


 


 03/15/19 10:00   83    18  


 


 03/15/19 06:08   86     


 


 03/15/19 05:45    85   20   141/90


 


 03/15/19 05:30   110 H    20  


 


 03/14/19 22:00   95 H     


 


 03/14/19 21:34    79   22  


 


 03/14/19 20:01  97.9 F   79   22   103/61


 


 03/14/19 18:54    83   20   98/60


 


 03/14/19 18:24   83    18  


 


 03/14/19 17:48  98.6 F  92 H    22  103/73 














  Pulse Ox


 


 03/15/19 15:17 


 


 03/15/19 14:40  92 L


 


 03/15/19 14:09  94 L


 


 03/15/19 13:58  94 L


 


 03/15/19 10:00  98


 


 03/15/19 06:08  95


 


 03/15/19 05:45  97


 


 03/15/19 05:30  91 L


 


 03/14/19 22:00  93 L


 


 03/14/19 21:34 


 


 03/14/19 20:01  93 L


 


 03/14/19 18:54  93 L


 


 03/14/19 18:24  94 L


 


 03/14/19 17:48  88 L














- General


General Appearance: Alert, Oriented x3, Cooperative, No acute distress (able to 

speak full sentences)


Limitations: No limitations





- Head


Head exam: Atraumatic, Normocephalic, Normal inspection


Head exam detail: negative: Abrasion, Contusion, Bautista's sign, General 

tenderness, Hematoma, Laceration





- Eye


Eye exam: Normal appearance.  negative: Conjunctival injection, Periorbital 

swelling, Periorbital tenderness, Scleral icterus





- ENT


Ear exam: negative: Auricular hematoma, Auricular trauma


Nasal Exam: negative: Active bleeding, Discharge, Dried blood, Foreign body


Mouth exam: negative: Drooling, Laceration, Muffled voice, Tongue elevation





- Neck


Neck exam: Normal inspection.  negative: Meningismus, Tenderness





- Respiratory


Respiratory exam: Prolonged expiratory, Wheezes.  negative: Accessory muscle use

, Rales, Respiratory distress, Rhonchi





- Cardiovascular


Cardiovascular Exam: Regular rate, Normal rhythm, Normal heart sounds





- GI/Abdominal


GI/Abdominal exam: Soft.  negative: Rebound, Rigid, Tenderness





- Rectal


Rectal exam: Deferred





- 


 exam: Deferred





- Extremities


Extremities exam: Normal inspection.  negative: Pedal edema, Tenderness





- Back


Back exam: Reports: Normal inspection.  Denies: CVA tenderness (R), CVA 

tenderness (L)





- Neurological


Neurological exam: Alert, Normal gait, Oriented X3





- Psychiatric


Psychiatric exam: Normal affect, Normal mood





- Skin


Skin exam: Other (Evan throughout ).  negative: Abrasion


Type of lesion: negative: abrasion





Results





- Labs


Result Diagrams: 


 03/15/19 06:17





 03/15/19 06:17


Labs Last 24 Hours: 


 Laboratory Results - last 24 hr











  03/14/19 03/14/19 03/15/19





  18:06 18:06 06:17


 


WBC  7.2   4.7


 


RBC  4.96   4.98


 


Hgb  15.2   15.2


 


Hct  45.9   46.7


 


MCV  92.5   93.8


 


MCH  30.6   30.5


 


MCHC  33.1   32.5


 


RDW  15.0 H   14.7 H


 


Plt Count  130   120 L


 


MPV  9.7   9.9


 


Gran %  68.6   74.9


 


Neutrophils %    75.0


 


Lymphocytes %  21.4   21.5


 


Monocytes %  9.5 H   3.2


 


Eosinophils %  0.4   0.4


 


Basophils %  0.1   0.0


 


Lymphocytes    22.0


 


Monocytes    3.0


 


Sodium   138 


 


Potassium   4.3 


 


Chloride   99 


 


Carbon Dioxide   25.0 


 


Anion Gap   14.0 


 


BUN   26 H 


 


Creatinine   1.2 


 


Estimated GFR   > 60 


 


Random Glucose   132 H 


 


Calcium   8.9 


 


Total Bilirubin   0.60 


 


AST   36 


 


ALT   26 


 


Alkaline Phosphatase   72 


 


Troponin T   < 0.010 


 


Total Protein   7.3 


 


Albumin   4.2 


 


Globulin   3.1 


 


Albumin/Globulin Ratio   1.4 














  03/15/19





  06:17


 


WBC 


 


RBC 


 


Hgb 


 


Hct 


 


MCV 


 


MCH 


 


MCHC 


 


RDW 


 


Plt Count 


 


MPV 


 


Gran % 


 


Neutrophils % 


 


Lymphocytes % 


 


Monocytes % 


 


Eosinophils % 


 


Basophils % 


 


Lymphocytes 


 


Monocytes 


 


Sodium  142


 


Potassium  4.7 H


 


Chloride  102


 


Carbon Dioxide  27.0


 


Anion Gap  13.0


 


BUN  28 H


 


Creatinine  1.1


 


Estimated GFR  > 60


 


Random Glucose  182 H


 


Calcium  8.7 L


 


Total Bilirubin  0.40


 


AST  33


 


ALT  25


 


Alkaline Phosphatase  65


 


Troponin T 


 


Total Protein  6.7


 


Albumin  3.8 L


 


Globulin  2.9


 


Albumin/Globulin Ratio  1.3














- Imaging and Cardiology


  ** Chest x-ray


Status: Report reviewed





VTE H&P Assessment





- Risk for VTE


Risk for VTE: Yes


Risk Level: Moderate


Risk Assessment Date: 03/15/19


Risk Assessment Time: 15:00


VTE Orders Placed or Will Be Placed: Yes





Plan





- Detailed Diagnosis and Plan


(1) COPD exacerbation


Status: Acute   Base Code: J44.1 - CHRONIC OBSTRUCTIVE PULMONARY DISEASE W (

ACUTE) EXACERBATION   Comment: 3/15/19:


-CXR: Hyperinflation consistent with advanced COPD, persistent density LLL, 

probably fibrosis (similar to previous CXRs)


-Required O2 in ER, weaning down to 90% on RA which is pt's baseline


-IV Solu-medrol given, will stop and change to pt usual home PO prednisone dose


-Continue Doxycycline 100mg BID per PCP prescription from previous day


-Has neb and albuterol solution at home (can d/c with home tx when improved)


   





(2) Hypoxia


Status: Acute   Base Code: R09.02 - HYPOXEMIA   Comment: 3/15/19:


-Baseline Sats 90-93% on RA at home


-O2 required in ED and overnight


-Weaned off to RA while resting in bed


-O2 sat decreased to 87% while ambulating in hallway, 2L per NC placed and O2 

sat increased to 91%.    





(3) DVT prophylaxis


Status: Acute   Base Code: [id number] -    Comment: 3/15/19:


-Moderate risk d/t age and co-morbids


-Continue home dose of Eliquis 5mg BID   





(4) Full code status


Status: Acute   Base Code: Z78.9 - OTHER SPECIFIED HEALTH STATUS   Comment: 3/15

/19:


-Full code this admission

## 2019-03-15 NOTE — RADIOLOGY REPORT
EXAM:  CHEST, TWO VIEWS



HISTORY:  SHORTNESS OF BREATH, DIFFICULTY IN BREATHING. 



TECHNIQUE:  PA and lateral views of the chest were obtained.  



Comparison:  PA chest 2/18/19.  



FINDINGS:  The heart is not enlarged.  The lungs appear quite hyperinflated 
consistent with advanced underlying COPD.  Scattered parenchymal streaky 
density on the left similar to before likely all representing chronic fibrosis.
  Allowing for this, no definite acute infiltrate is seen.  There is chronic 
blunting of the left lateral costophrenic angle as well presumably by pleural 
thickening.  No definite pneumothorax evident.  There is bilateral apical 
pleural thickening slightly more so on the left.  



IMPRESSION:  

1.  FINDINGS CONSISTENT WITH ADVANCED COPD WITH SOME PERSISTENT INTERSTITIAL 
INFILTRATE PARTICULARLY ON THE LEFT LIKELY ALL REPRESENTING FIBROSIS.  



2.  NO DEFINITE ACUTE INFILTRATE SEEN TODAY.  



3.  CHRONIC BLUNTING OF THE LEFT LATERAL COSTOPHRENIC ANGLE PROBABLY BY PLEURAL 
THICKENING.   APICAL PLEURAL PLEURAL THICKENING PARTICULARLY ON THE LEFT AS 
WELL.   



JOB NUMBER:  [id number]
MTDD

## 2019-03-15 NOTE — DISCHARGE SUMMARY
Providers


Discharge Summary Date: 03/15/19


Date of admission: 


03/14/19 19:39





Attending physician: 


AGAPITO STREETER





Primary care physician: 


TERRY GOODMAN D.O.








Physical Exam





- Vital Signs


Vital Signs: 


 Vital Signs - Last 24 Hrs











  Temp Pulse Pulse Pulse Resp BP BP


 


 03/15/19 15:17      16   104/61


 


 03/15/19 14:40       


 


 03/15/19 14:09   90    18  


 


 03/15/19 13:58  97.3 F L    83  16   87/58


 


 03/15/19 10:00   83    18  


 


 03/15/19 06:08   86     


 


 03/15/19 05:45    85   20   141/90


 


 03/15/19 05:30   110 H    20  


 


 03/14/19 22:00   95 H     


 


 03/14/19 21:34    79   22  


 


 03/14/19 20:01  97.9 F   79   22   103/61


 


 03/14/19 18:54    83   20   98/60


 


 03/14/19 18:24   83    18  


 


 03/14/19 17:48  98.6 F  92 H    22  103/73 














  Pulse Ox


 


 03/15/19 15:17 


 


 03/15/19 14:40  92 L


 


 03/15/19 14:09  94 L


 


 03/15/19 13:58  94 L


 


 03/15/19 10:00  98


 


 03/15/19 06:08  95


 


 03/15/19 05:45  97


 


 03/15/19 05:30  91 L


 


 03/14/19 22:00  93 L


 


 03/14/19 21:34 


 


 03/14/19 20:01  93 L


 


 03/14/19 18:54  93 L


 


 03/14/19 18:24  94 L


 


 03/14/19 17:48  88 L














- General


General Appearance: Alert, Oriented x3, Cooperative, No acute distress (while 

at rest in bed), Mild distress (during ambulation in hallway)


Limitations: No limitations





- Head


Head exam: Atraumatic, Normocephalic, Normal inspection


Head exam detail: negative: Abrasion, Contusion, Bautista's sign, General 

tenderness, Hematoma, Laceration





- Eye


Eye exam: Normal appearance.  negative: Conjunctival injection, Periorbital 

swelling, Periorbital tenderness, Scleral icterus





- ENT


Ear exam: negative: Auricular hematoma, Auricular trauma


Nasal Exam: negative: Active bleeding, Discharge, Dried blood, Foreign body


Mouth exam: negative: Drooling, Laceration, Muffled voice, Tongue elevation





- Neck


Neck exam: Normal inspection.  negative: Meningismus, Tenderness





- Respiratory


Respiratory exam: Decreased breath sounds, Prolonged expiratory, Wheezes.  

negative: Rales, Respiratory distress, Rhonchi





- Cardiovascular


Cardiovascular Exam: Regular rate, Normal rhythm, Normal heart sounds





- GI/Abdominal


GI/Abdominal exam: Soft.  negative: Rebound, Rigid, Tenderness





- Rectal


Rectal exam: Deferred





- 


 exam: Deferred





- Extremities


Extremities exam: Normal inspection.  negative: Pedal edema, Tenderness





- Back


Back exam: Denies: CVA tenderness (R), CVA tenderness (L)





- Neurological


Neurological exam: Alert, Normal gait, Oriented X3





- Psychiatric


Psychiatric exam: Normal affect, Normal mood





- Skin


Skin exam: Other (generalized ruddiness).  negative: Abrasion


Type of lesion: negative: abrasion





Hospitalization





- Hospitalization


Admission Diagnosis: COPD exacerbation.  Hypoxia





- Problem List/Discharge Diagnosis


(1) COPD exacerbation


Status: Acute   Base Code: J44.1 - CHRONIC OBSTRUCTIVE PULMONARY DISEASE W (

ACUTE) EXACERBATION   Comment: 3/15/19:


-CXR: Hyperinflation consistent with advanced COPD, persistent density LLL, 

probably fibrosis (similar to previous CXRs)


-Required O2 in ER, weaning down to 90% on RA which is pt's baseline


-Continue Prednisone as prescribed by PCP (10mg daily x 3 days, then back down 

to usual dose of 5mg daily)


-Continue Doxycycline 100mg BID per PCP prescription from previous day


-Continue with home nebulizer and inhaler regimen


-Follow up with PCP in 1 week and with [facility] on 3/28/19


-   





(2) Hypoxia


Status: Acute   Base Code: R09.02 - HYPOXEMIA   Comment: 3/15/19:


-Baseline Sats 90-93% on RA at home


-O2 required in ED and overnight


-Weaned off to RA while resting in bed


-O2 sat decreased to 87% while ambulating in hallway, 2L per NC placed and O2 

sat increased to 91%. 


-Home O2 ordered    





(3) DVT prophylaxis


Status: Acute   Base Code: [id number] -    Comment: 3/15/19:


-Moderate risk d/t age and co-morbids


-Continue home dose of Eliquis 5mg BID   





(4) Full code status


Status: Acute   Base Code: Z78.9 - OTHER SPECIFIED HEALTH STATUS   Comment: 3/15

/19:


-Full code this admission   





- Hospitalization Course


Disposition: Home, Self-Care


Hospital Course: 





Pierre Hussein is a 68 y.o.  M who presented to the [facility] ED d/t 

increasing SOB over last several days with oxygen saturations around 88% on RA. 

Did see PCP, Dr. Goodman, in office the previous day and was started on 

Doxycycline and daily dose of Prednisone was increased to 10mg. Has COPD with 

an admission in January 2019 and February 2019 for similar symptoms. Follows 

with a lung doctor at [facility] for which he has a f/u on 3/28/19. 





ED Course:


EKG: NSR, rate 83, normal axis, no acute ST-T wave changes


CXR: Hyperinflation and persistent density LLL, probable fibrosis


CMP and CBC unremarkable





3/15/19 1545:





Vitals: 104/61  16  96% on RA





Lying flat in bed, with no oxygen supplementation. Wife at bedside. Reports 

that he feels much less SOB than the previous evening. States that he has been 

off of oxygen for over an hour. Has been up to the bathroom without O2 without 

much difficulty. Does not have O2 at home but does report that he used to but 

insurance wouldn't pay for it anymore because he had improved. Normally, he 

states his O2 sat is around 90-93% on RA. Feels that he is back to baseline and 

would like to return home. Has Doxycyline and Prednisone as ordered from PCP 

but would like to have home O2 to use for the next few weeks. Feels that if he 

had O2 at home last evening, he likely wouldn't have come to the ED.





3/15/19 1625:





Ambulated in hallway on RA with RT. O2 sat decreased to 87% with ambulation. 

Was placed on 2L per NC when pt reached 87%. O2 sat then increased to 91%. At 

rest, pt maintained sats on RA at 90-92%


Procedures: 


Imaging and X-Rays





03/14/19 18:09


CHEST 2 VIEWS [RAD] Stat 








Cardiology Procedures





03/14/19 18:06


EKG NOW 











Abnormal Labs: 


 Abnormal Lab Results











  03/14/19 03/14/19 03/15/19 Range/Units





  18:06 18:06 06:17 


 


RDW  15.0 H   14.7 H  (11.5-14.5)  %


 


Plt Count    120 L  (130-400)  K/uL


 


Monocytes %  9.5 H    (0-9)  %


 


Potassium     (3.4-4.5)  mmol/L


 


BUN   26 H   (8-23)  mg/dL


 


Random Glucose   132 H   ()  mg/dL


 


Calcium     (8.8-10.2)  mg/dL


 


Albumin     (4.0-5.0)  g/dL














  03/15/19 Range/Units





  06:17 


 


RDW   (11.5-14.5)  %


 


Plt Count   (130-400)  K/uL


 


Monocytes %   (0-9)  %


 


Potassium  4.7 H  (3.4-4.5)  mmol/L


 


BUN  28 H  (8-23)  mg/dL


 


Random Glucose  182 H  ()  mg/dL


 


Calcium  8.7 L  (8.8-10.2)  mg/dL


 


Albumin  3.8 L  (4.0-5.0)  g/dL











Condition at Discharge: (2) Stable





Discharge Medications





- Discharge Medications


Home Medications: 


 Ambulatory Orders





Alendronate Sodium [Fosamax] 70 mg PO WEEKLY 01/07/19 [Last Taken 1 Day Ago ~03/ 13/19]


Apixaban [Eliquis] 5 mg PO BID 01/07/19 [Last Taken 1 Day Ago ~03/13/19]


Atorvastatin Calcium 80 mg PO QHS 01/07/19 [Last Taken 1 Day Ago ~03/13/19]


Budesonide/Formoterol Fumarate [Symbicort 160-4.5 Mcg Inhaler] 2 inh IH BID 01/ 07/19 [Last Taken 1 Day Ago ~03/13/19]


Metoprolol Succinate 25 mg PO DAILY 01/07/19 [Last Taken 1 Day Ago ~03/13/19]


Nitroglycerin 0.4MG [Nitrostat 0.4MG] 1 tab SL Q5MIN PRN 01/07/19 [Last Taken 1 

Day Ago ~03/13/19]


Tiotropium Bromide [Spiriva] 18 mcg IH DAILY 01/07/19 [Last Taken 1 Day Ago ~03/ 13/19]


Ubidecarenone [Coenzyme Q10] 30 mg PO QHS 01/07/19 [Last Taken 1 Day Ago ~03/13/ 19]


Albuterol Sulfate 0.083% [Neb] [Albuterol Sulfate] 2.5 mg INH RESP.Q4H.WA  

nebulization solution 01/09/19 [Last Taken 1 Day Ago ~03/13/19]


Prednisone [Prednisone 20Mg] 20 mg PO DAILY 02/18/19 [Last Taken 1 Day Ago ~03/ 13/19]


Albuterol Sulfate 0.083% [Neb] [Albuterol Sulfate] 2.5 mg INH RESP.Q2H PRN  

nebulization solution 02/20/19 [Last Taken 1 Day Ago ~03/13/19]


Calcium Carbonate [Tums] 500 mg PO Q4H PRN  tab.chew 02/20/19 [Last Taken 1 Day 

Ago ~03/13/19]


Doxycycline Hyclate [Vibramycin] 100 mg PO BID 03/14/19 [Last Taken 1 Day Ago ~

03/13/19]


Acetaminophen [Tylenol 500Mg Tab] 1,000 mg PO Q6H PRN  tablet 03/15/19 [Last 

Taken Unknown]


Albuterol Sulfate 0.083% [Neb] [Albuterol Sulfate] 2.5 mg INH RESP.Q2H PRN  

nebulization solution 03/15/19 [Last Taken Unknown]


Doxycycline Hyclate [Vibramycin] 100 mg PO BID  capsule 03/15/19 [Last Taken 

Unknown]


Metoprolol Succinate [Toprol Xl] 25 mg PO DAILY  tab.er.24h 03/15/19 [Last 

Taken Unknown]











Discharge Plan





- Discharge Instructions


Activity at Discharge: Increase Activity as Tolerated


Diet at Discharge: Advance to Usual Diet


Instructions:  COPD (Chronic Obstructive Pulmonary Disease) (DC)


Additional Instructions: 





 2





Activity: 


 


 Increase Activity as Tolerated








 2





Diet: Advance to Usual Diet








 2





Consults:   none 








 2





Follow Up: With primary care physician next week








 2





  





  








 2





Additional: []





Follow up Primary Care Provider within 7 days





Use home oxygen at 2L per Nasal Cannula when up moving around. 





Continue Prednisone and Doxycycline as prescribed by your Primary Care Provider 

earlier this week





Quality Measures





- Quality Measures


Quality Measures: Advance Directives, Documentation of Current Medications in 

Medical Record, Elder Maltreatment Screen and Follow-Up Plan, Screening for 

High Blood Pressure and F/U Documented





- Current Medications


Quality Measure: Measure #130: Documentation of Current Medications


Documentation of Current Medications: <Current Medications Documented/Reviewed> 

[]





- Blood Pressure Screening


Quality Measure: Screening for High Blood Pressure and Follow-Up Documented


Does Patient Have Any of the Following: No


Blood Pressure Classification: Normal BP Reading


Systolic Measurement: 103


Diastolic Measurement: 73


Screening for High Blood Pressure: < Normal BP, F/U Not Required > []





- Advance Directives


Quality Measure: Measure #47: Care Plan


Advance Directives Established: No


Advance Directives Information Provided To Patient: Yes


Advance Directives on File: Yes


Living Will: No


Power of : No


Advance Care Planning: <Care Plan/Decision Maker Documented; Discussed & 

Documented> [0943F]





- Elder Abuse Suspicion Index


Screening: Elder Abuse Suspicion Index Screening


Rely on people for bathing, dressing, shopping, banking, etc: No


Prevented from getting food, clothes, medication, etc: No


Made to feel shamed or threatened by someone: No


Forced to sign papers or use money against will: No


Feel afraid, touched in ways not wanted or hurt physically: No


Poor eye contact, withdrawn, malnourished, cuts or bruises: No


Screening Result: Negative result


EASI Reference Information: Thierno SALDIVAR, Barney C, Darling D, Wade EATON.Development and validation of a tool to assist physicians identification of 

elder abuse: The Elder Abuse Suspicion  Index (EASI ).  Journal of Elder Abuse 

and Neglect, 2008; 20 (3): 276-300.





- Elder Maltreatment Screen


Quality Measures: Elder Maltreatment Screen and Follow-Up Plan


Elder Maltreatment Screen: <Negative, No Follow-Up Plan Required> []

## 2019-04-07 ENCOUNTER — HOSPITAL ENCOUNTER (OUTPATIENT)
Dept: HOSPITAL 59 - ER | Age: 69
Setting detail: OBSERVATION
Discharge: HOME | End: 2019-04-07
Attending: INTERNAL MEDICINE | Admitting: INTERNAL MEDICINE
Payer: COMMERCIAL

## 2019-04-07 DIAGNOSIS — J44.1: ICD-10-CM

## 2019-04-07 DIAGNOSIS — Z95.5: ICD-10-CM

## 2019-04-07 DIAGNOSIS — F17.210: ICD-10-CM

## 2019-04-07 DIAGNOSIS — Z86.79: ICD-10-CM

## 2019-04-07 DIAGNOSIS — Z79.01: ICD-10-CM

## 2019-04-07 DIAGNOSIS — I25.2: ICD-10-CM

## 2019-04-07 DIAGNOSIS — J96.01: Primary | ICD-10-CM

## 2019-04-07 DIAGNOSIS — J43.9: ICD-10-CM

## 2019-04-07 DIAGNOSIS — I25.10: ICD-10-CM

## 2019-04-07 LAB
ALBUMIN SERPL-MCNC: 4.2 G/DL (ref 4–5)
ALBUMIN/GLOB SERPL: 1.8 {RATIO} (ref 1.1–1.8)
ALP SERPL-CCNC: 75 U/L (ref 40–129)
ALT SERPL-CCNC: 17 U/L (ref ?–41)
ANION GAP SERPL CALC-SCNC: 10 MMOL/L (ref 7–16)
AST SERPL-CCNC: 16 U/L (ref 10–50)
BASOPHILS NFR BLD: 0.1 % (ref 0–6)
BILIRUB SERPL-MCNC: 0.5 MG/DL (ref 0.2–1)
BUN SERPL-MCNC: 15 MG/DL (ref 8–23)
CO2 SERPL-SCNC: 31 MMOL/L (ref 22–29)
CREAT SERPL-MCNC: 1.1 MG/DL (ref 0.7–1.2)
EOSINOPHIL NFR BLD: 1 % (ref 0–6)
ERYTHROCYTE [DISTWIDTH] IN BLOOD BY AUTOMATED COUNT: 14.8 % (ref 11.5–14.5)
EST GLOMERULAR FILTRATION RATE: > 60 ML/MIN
GLOBULIN SER-MCNC: 2.4 GM/DL (ref 1.4–4.8)
GLUCOSE SERPL-MCNC: 149 MG/DL (ref 74–109)
GRANULOCYTES NFR BLD: 59.3 % (ref 47–80)
HCT VFR BLD CALC: 46.1 % (ref 42–52)
HGB BLD-MCNC: 14.7 GM/DL (ref 14–18)
LYMPHOCYTES NFR BLD AUTO: 30.7 % (ref 16–45)
MCH RBC QN AUTO: 30.6 PG (ref 27–33)
MCHC RBC AUTO-ENTMCNC: 31.9 G/DL (ref 32–36)
MCV RBC AUTO: 95.8 FL (ref 81–97)
MONOCYTES NFR BLD: 8.9 % (ref 0–9)
PLATELET # BLD: 146 K/UL (ref 130–400)
PMV BLD AUTO: 9.5 FL (ref 7.4–10.4)
PROT SERPL-MCNC: 6.6 G/DL (ref 6.6–8.7)
RBC # BLD AUTO: 4.81 M/UL (ref 4.4–5.7)
WBC # BLD AUTO: 8 K/UL (ref 4.2–12.2)

## 2019-04-07 PROCEDURE — 94660 CPAP INITIATION&MGMT: CPT

## 2019-04-07 PROCEDURE — 93005 ELECTROCARDIOGRAM TRACING: CPT

## 2019-04-07 PROCEDURE — 99236 HOSP IP/OBS SAME DATE HI 85: CPT

## 2019-04-07 PROCEDURE — 94640 AIRWAY INHALATION TREATMENT: CPT

## 2019-04-07 PROCEDURE — 94761 N-INVAS EAR/PLS OXIMETRY MLT: CPT

## 2019-04-07 PROCEDURE — 93010 ELECTROCARDIOGRAM REPORT: CPT

## 2019-04-07 PROCEDURE — 84484 ASSAY OF TROPONIN QUANT: CPT

## 2019-04-07 PROCEDURE — 80053 COMPREHEN METABOLIC PANEL: CPT

## 2019-04-07 PROCEDURE — 96365 THER/PROPH/DIAG IV INF INIT: CPT

## 2019-04-07 PROCEDURE — 99291 CRITICAL CARE FIRST HOUR: CPT

## 2019-04-07 PROCEDURE — 96375 TX/PRO/DX INJ NEW DRUG ADDON: CPT

## 2019-04-07 PROCEDURE — 85025 COMPLETE CBC W/AUTO DIFF WBC: CPT

## 2019-04-07 PROCEDURE — 71045 X-RAY EXAM CHEST 1 VIEW: CPT

## 2019-04-07 NOTE — DISCHARGE SUMMARY
Providers


Discharge Summary Date: 04/07/19


Date of admission: 


04/07/19 07:24





Attending physician: 


AGAPITO STREETER





Primary care physician: 


TERRY GOODMAN D.O.








Physical Exam





- Vital Signs


Vital Signs: 


 Vital Signs - Last 24 Hrs











  Temp Pulse Pulse Pulse Resp BP BP


 


 04/07/19 09:33   101 H    20  


 


 04/07/19 08:10      24  


 


 04/07/19 07:56      18  


 


 04/07/19 07:25  97.8 F    102 H  22   118/71


 


 04/07/19 07:23    94 H   28 H   107/65


 


 04/07/19 06:50    99 H   28 H   98/64


 


 04/07/19 06:11   104 H    12  


 


 04/07/19 05:51   117 H    14  


 


 04/07/19 05:48    112 H   14   125/95


 


 04/07/19 05:28  97.7 F  120 H    16  162/117 














  Pulse Ox


 


 04/07/19 09:33  95


 


 04/07/19 08:10 


 


 04/07/19 07:56  92 L


 


 04/07/19 07:25  92 L


 


 04/07/19 07:23  96


 


 04/07/19 06:50  94 L


 


 04/07/19 06:11  97


 


 04/07/19 05:51  95


 


 04/07/19 05:48  94 L


 


 04/07/19 05:28  92 L














- General


General Appearance: Alert, Oriented x3, Cooperative, Moderate distress


Limitations: No limitations





- Head


Head exam: Atraumatic, Normocephalic, Normal inspection


Head exam detail: negative: Abrasion, Contusion, Bautista's sign, General 

tenderness, Hematoma, Laceration





- Eye


Eye exam: Normal appearance.  negative: Conjunctival injection, Periorbital 

swelling, Periorbital tenderness, Scleral icterus





- ENT


Ear exam: negative: Auricular hematoma, Auricular trauma


Nasal Exam: negative: Active bleeding, Discharge, Dried blood, Foreign body


Mouth exam: negative: Drooling, Laceration, Muffled voice, Tongue elevation





- Neck


Neck exam: Normal inspection.  negative: Meningismus, Tenderness





- Respiratory


Respiratory exam: Decreased breath sounds, Respiratory distress.  negative: 

Rales, Rhonchi, Stridor





- Cardiovascular


Cardiovascular Exam: Normal rhythm, Normal heart sounds, Tachycardia





- GI/Abdominal


GI/Abdominal exam: Soft.  negative: Rebound, Rigid, Tenderness





- Rectal


Rectal exam: Deferred





- 


 exam: Deferred





- Extremities


Extremities exam: Normal inspection.  negative: Pedal edema, Tenderness





- Back


Back exam: Denies: CVA tenderness (R), CVA tenderness (L)





- Neurological


Neurological exam: Alert, Normal gait, Oriented X3





- Psychiatric


Psychiatric exam: Normal affect, Normal mood





- Skin


Skin exam: Normal color.  negative: Abrasion


Type of lesion: negative: abrasion





Hospitalization





- Hospitalization


Admission Diagnosis: Respiratory Failure.  COPD exacerbation.  Hypoxia





- Problem List/Discharge Diagnosis


(1) COPD exacerbation


Current Visit: Yes   Status: Acute   Base Code: J44.1 - CHRONIC OBSTRUCTIVE 

PULMONARY DISEASE W (ACUTE) EXACERBATION   Comment: 


4/7/19:


-CXR: Previously seen fibrosis, advanced COPD. 


- Currently on 2 liters oxygen and saturating  weaning down to 90% on RA which 

is pt's baseline


-Continue Prednisone as prescribed by PCP (10mg daily x 3 days, then back down 

to usual dose of 5mg daily)


-Continue with home nebulizer and inhaler regimen, and Symbicort, Spiriva daily

    





(2) Hypoxia


Current Visit: Yes   Status: Acute   Base Code: R09.02 - HYPOXEMIA   Comment: 


4/7/19:


-Baseline Sats 90-93% at baseline.    





(3) CAD (coronary artery disease)


Current Visit: Yes   Status: Acute   Base Code: I25.10 - ATHSCL HEART DISEASE 

OF NATIVE CORONARY ARTERY W/O ANG PCTRS   Comment: 


4/7/19:


- CAD s/p PCI with IRMA x 2. Troponin x 1 negative. 


- On statin, Plavix, BB 


- Cardiac monitoring    





(4) History of atrial fibrillation


Current Visit: Yes   Status: Acute   Base Code: Z86.79 - PERSONAL HISTORY OF 

OTHER DISEASES OF THE CIRCULATORY SYSTEM   Comment: 


4/7/19:


- s/p av charis ablation x 2 


- on BB and Eliquis.    





(5) Tobacco dependence due to cigarettes


Current Visit: No   Status: Acute   Base Code: F17.210 - NICOTINE DEPENDENCE, 

CIGARETTES, UNCOMPLICATED   Comment: 


4/7/19:


-Pt smoking 1/2 pk/daily. 


- On Wellbutrin 150mg for smoking cessation.   





(6) Full code status


Current Visit: No   Status: Acute   Base Code: Z78.9 - OTHER SPECIFIED HEALTH 

STATUS   Comment: 


4/7/19:


-Full code this admission   





(7) DVT prophylaxis


Current Visit: No   Status: Acute   Base Code: [id number] -    Comment: 


4/7/19:


-Moderate risk d/t age and co-morbids


-Continue home dose of Eliquis 5mg BID   





- Disposition


discharge home with instructions to avoid triggers by wearing a mask when doing 

yard work and begin taking azithromycin. 





- Hospitalization Course


Hospital Course: 


Mr. Hussein is a 69 y/o male with frequent admissions for COPD exacerbation 

here for the same. He denies exertion and says that symptoms began when he came 

in from mowing his lawn and doing yard work yesterday afternoon. He says that 

he was around a lot of dust and did not wear a mask. The patient admits to 

still smoking but has reduced to 1/2 pack since starting Wellbutrin. He also 

admits to not using Azithromycin as prescribed by his Pulmonologist over a week 

ago. The patient says that he did not understand the benefit of using it so he 

did not even start it. He is on 2 liters nasal oxygen at night and uses 

albuterol nebulizer daily in addition to Symbicort, Spiriva and daily 

Prednisone. 





On presentation to the ED this morning the patient was hypoxic and tachycardic 

requiring three duoneb treatments. Chest xray was unremarkable for and 

infiltrates and only chronic fibrosis was described. The patient was admitted 

for an acute exacerbation of COPD and continued respiratory treatment. On 

examination this morning the patient appears to be much improved with not 

complaint of respiratory distress and his wife says that he is back at his 

baseline. 





Re-evaluation 11:52: Patient back to baseline and appears to be resting 

comfortably. I went over plan of care with the patient and his wife and they 

understand and are agreeable. He was counseled on smoking cessation and the 

need the start taking the Azithromycin as prescribed. 





PCP: Dr. Goodman 








Procedures: 


Imaging and X-Rays





04/07/19 05:30


CHEST 1 VIEW [RAD] Stat 








Cardiology Procedures





04/07/19 05:30


EKG NOW 





04/07/19 07:27


Cardiac Monitor .Continuous 











Abnormal Labs: 


 Abnormal Lab Results











  04/07/19 04/07/19 Range/Units





  05:44 05:44 


 


MCHC  31.9 L   (32-36)  g/dl


 


RDW  14.8 H   (11.5-14.5)  %


 


Carbon Dioxide   31.0 H  (22-29)  mmol/L


 


Random Glucose   149 H  ()  mg/dL











Condition at Discharge: (2) Stable





Discharge Medications





- Discharge Medications


Home Medications: 


 Ambulatory Orders





Alendronate Sodium [Fosamax] 70 mg PO WEEKLY 01/07/19 [Last Taken 1 Day Ago ~03/ 13/19]


Apixaban [Eliquis] 5 mg PO BID 01/07/19 [Last Taken 1 Day Ago ~03/13/19]


Atorvastatin Calcium 80 mg PO QHS 01/07/19 [Last Taken 1 Day Ago ~03/13/19]


Budesonide/Formoterol Fumarate [Symbicort 160-4.5 Mcg Inhaler] 2 inh IH BID 01/ 07/19 [Last Taken 1 Day Ago ~03/13/19]


Nitroglycerin 0.4MG [Nitrostat 0.4MG] 1 tab SL Q5MIN PRN 01/07/19 [Last Taken 1 

Day Ago ~03/13/19]


Tiotropium Bromide [Spiriva] 18 mcg IH DAILY 01/07/19 [Last Taken 1 Day Ago ~03/ 13/19]


Ubidecarenone [Coenzyme Q10] 30 mg PO QHS 01/07/19 [Last Taken 1 Day Ago ~03/13/ 19]


Albuterol Sulfate 0.083% [Neb] [Albuterol Sulfate] 2.5 mg INH RESP.Q4H.WA  

nebulization solution 01/09/19 [Last Taken 1 Day Ago ~03/13/19]


Prednisone [Prednisone 20Mg] 10 mg PO DAILY 02/18/19 [Last Taken 1 Day Ago ~03/ 13/19]


Calcium Carbonate [Tums] 500 mg PO Q4H PRN  tab.chew 02/20/19 [Last Taken 1 Day 

Ago ~03/13/19]


Acetaminophen [Tylenol 500Mg Tab] 1,000 mg PO Q6H PRN  tablet 03/15/19 [Last 

Taken Unknown]


Metoprolol Succinate [Toprol Xl] 25 mg PO DAILY  tab.er.24h 03/15/19 [Last 

Taken Unknown]


Bupropion HCl [Wellbutrin Xl] 150 mg PO DAILY 04/07/19 [Last Taken Unknown]











Discharge Plan





- Discharge Instructions


Activity at Discharge: Wear Oxygen At All Times


Diet at Discharge: Regular Diet


Additional Instructions: 


Begin using your oxygen at all times to keep your oxygen saturations > 90% 





Resume taking your Prednisone and inhalers as prescribed. 





Begin taking Azithromycin 25mg daily as prescribed by your Pulmonologist. 





Follow up with your PCP; Dr. Goodman within 1 week. 





Quality Measures





- Quality Measures


Quality Measures: Advance Directives, Coronary Artery Disease: Antiplatelet 

Therapy, Documentation of Current Medications in Medical Record, Elder 

Maltreatment Screen and Follow-Up Plan, Screening for High Blood Pressure and F/

U Documented





- Current Medications


Quality Measure: Measure #130: Documentation of Current Medications


Documentation of Current Medications: <Current Medications Documented/Reviewed> 

[]





- Blood Pressure Screening


Quality Measure: Screening for High Blood Pressure and Follow-Up Documented


Does Patient Have Any of the Following: Active Dx of HTN


Blood Pressure Classification: Hypertensive Reading


Systolic Measurement: 162


Diastolic Measurement: 117


Screening for High Blood Pressure: Patient Exclusion, Hx of HTN []





- Coronary Artery Disease


Quality Measure: Measure #6: Coronary Artery Disease (CAD)


Antiplatelet Therapy: <ASA or clopidogrel prescribed> [6328F]





- Advance Directives


Quality Measure: Measure #47: Care Plan


Advance Directives Established: No


Advance Directives Information Provided To Patient: Yes


Advance Directives on File: Yes


Living Will: No


Power of : No


Advance Care Planning: <Care Plan/Decision Maker Documented; Discussed & 

Documented> [8715F]





- Elder Abuse Suspicion Index


Screening: Elder Abuse Suspicion Index Screening


Rely on people for bathing, dressing, shopping, banking, etc: No


Prevented from getting food, clothes, medication, etc: No


Made to feel shamed or threatened by someone: No


Forced to sign papers or use money against will: No


Feel afraid, touched in ways not wanted or hurt physically: No


Poor eye contact, withdrawn, malnourished, cuts or bruises: No


Screening Result: Negative result


EASI Reference Information: Thierno SALDIVAR, Barney C, Darling D, Wade EATON.Development and validation of a tool to assist physicians identification of 

elder abuse: The Elder Abuse Suspicion  Index (EASI ).  Journal of Elder Abuse 

and Neglect, 2008; 20 (3): 276-300.





- Elder Maltreatment Screen


Quality Measures: Elder Maltreatment Screen and Follow-Up Plan


Elder Maltreatment Screen: <Negative, No Follow-Up Plan Required> []

## 2019-04-07 NOTE — HISTORY & PHYSICAL
History of Present Illness





- Date of Service


Date of Service for History & Physical: 04/07/19





- History of Present Illness


Admitting Diagnosis: Respiratory Failure.  COPD exacerbation.  Hypoxia


History of Present Illness: 


Mr. Hussein is a 69 y/o male with frequent admissions for COPD exacerbation 

here for the same. He denies exertion and says that symptoms began when he came 

in from mowing his lawn and doing yard work yesterday afternoon. He says that 

he was around a lot of dust and did not wear a mask. The patient admits to 

still smoking but has reduced to 1/2 pack since starting Wellbutrin. He also 

admits to not using Azithromycin as prescribed by his Pulmonologist over a week 

ago. The patient says that he did not understand the benefit of using it so he 

did not even start it. He is on 2 liters nasal oxygen at night and uses 

albuterol nebulizer daily in addition to Symbicort, Spiriva and daily 

Prednisone. 





On presentation to the ED this morning the patient was hypoxic and tachycardic 

requiring three duoneb treatments. Chest xray was unremarkable for and 

infiltrates and only chronic fibrosis was described. The patient was admitted 

for an acute exacerbation of COPD and continued respiratory treatment. On 

examination this morning the patient appears to be much improved with not 

complaint of respiratory distress and his wife says that he is back at his 

baseline. 





PCP: Dr. Goodman 











Travel Screening





- Travel/Exposure Within Last 30 Days


Have you traveled within the last 30 days?: No





- Travel/Exposure Within Last Year


Have you traveled outside the [place] in the last year?: No





- Additonal Travel Details


Have you been exposed to anyone with a communicable illness?: No





- Travel Symptoms


Symptom Screening: None





Review of Systems


Constitutional: Denies: Chills, Fever, Malaise, Night sweats


Eyes: Denies: Eye discharge, Eye pain


ENT: Denies: Congestion, Ear pain, Epistaxis


Respiratory: Reports: Dyspnea.  Denies: Cough, Hemoptysis


Cardiovascular: Reports: Dyspnea on exertion.  Denies: Chest pain, Edema


Endocrine: Denies: Fatigue, Heat or cold intolerance


Gastrointestinal: Denies: Abdominal pain, Nausea, Vomiting


Genitourinary: Denies: Incontinence, Retention


Musculoskeletal: Denies: Arthralgia, Back pain, Gout, Joint swelling


Skin: Denies: Bruising, Change in color, Change in hair/nails


Neurological: Denies: Abnormal gait, Confusion, Headache, Seizure


Psychiatric: Denies: Anxiety


Hematological/Lymphatic: Denies: Anemia, Blood Clots





Past Medical History





- SOCIAL HISTORY


Smoking Status: Light tobacco smoker (<10/day)


Alcohol Use: Rare


Drug Use: None





- RESPIRATORY


Hx Respiratory Disorders: Yes


Hx Bronchitis: Yes


Hx COPD: Yes


Hx Pneumonia: Yes


Comment:: Emphsema





- CARDIOVASCULAR


Hx Cardio Disorders: Yes


Hx Cardiac Cath: Yes (2 stents)


Hx Chest Pain: Yes


Hx Heart Attack: Yes


Hx Irregular Heartbeat: Yes (a-fib)


Hx Palpitations: Yes


Comment:: [facility]





- NEURO


Hx Neuro Disorders: Yes


Hx Seizures: Yes ("non-epileptiform spells", last 2016)





- GI


Hx GI Disorders: No





- 


Hx Genitourinary Disorders: No





- ENDOCRINE


Hx Endocrine Disorders: No





- MUSCULOSKELETAL


Hx Musculoskeletal Disorders: Yes


Hx Back Injury: Yes (L4-5 cage)





- PSYCH


Hx Psych Problems: Yes


Hx Anxiety: Yes


Hx Depression: Yes





- HEMATOLOGY/ONCOLOGY


Hx Hematology/Oncology Disorders: No


Comment:: on blood thinners for afib which is resolved





Family Medical History


Any Significant Family History?: Yes


Hx Cancer: Father





H&P Meds/Allergies





- Allergies


Allergies: 


 Allergies











Allergy/AdvReac Type Severity Reaction Status Date / Time


 


morphine AdvReac  BEHAVIORAL Verified 03/14/19 17:59





   CHANGES  














- Home Medications


 Home Medications











 Medication  Instructions  Recorded  Confirmed  Last Taken


 


Bupropion HCl [Wellbutrin Xl] 150 mg PO DAILY 04/07/19 04/07/19 Unknown








 Previous Rx's











 Medication  Instructions  Recorded


 


Albuterol Sulfate 0.083% [Neb] 2.5 mg INH RESP.Q4H.WA 01/09/19





[Albuterol Sulfate] nebulization solution 


 


Calcium Carbonate [Tums] 500 mg PO Q4H PRN  tab.chew 02/20/19


 


Acetaminophen [Tylenol 500Mg Tab] 1,000 mg PO Q6H PRN  tablet 03/15/19


 


Metoprolol Succinate [Toprol Xl] 25 mg PO DAILY  tab.er.24h 03/15/19














- Active Medications


Active Medications: 


 Current Medications





Acetaminophen (Tylenol 500mg Tab)  1,000 mg PO Q6H PRN


   PRN Reason: PAIN - MILD(1-4)/FEVER


Albuterol Sulfate (Albuterol Sulfate)  2.5 mg INH RESP.Q2H PRN


   PRN Reason: DIFFICULTY IN BREATHING


Albuterol/Ipratropium (Duoneb)  3 ml INH RESP.Q4H.[facility]


   Last Admin: 04/07/19 09:33 Dose:  3 ml


Apixaban (Eliquis)  5 mg PO BID FABI


Atorvastatin Calcium (Lipitor)  80 mg PO QHS [facility]


Bupropion HCl (Wellbutrin Sr)  150 mg PO DAILY [facility]


Sodium Chloride ()  1,000 mls @ 125 mls/hr IV .Q8H PRN


   PRN Reason: LARGE VOLUME IV


Methylprednisolone Sodium Succinate (Solu-Medrol)  125 mg IVP DAILY [facility]


Metoprolol Succinate (Toprol Xl)  25 mg PO DAILY [facility]


Non-Formulary Medication (Alendronate Sodium [Fosamax])  70 mg PO WEEKLY [facility]











Physical Exam





- Vital Signs


Vital Signs: 


 Vital Signs - Last 24 Hrs











  Temp Pulse Pulse Pulse Resp BP BP


 


 04/07/19 08:10      24  


 


 04/07/19 07:56      18  


 


 04/07/19 07:25  97.8 F    102 H  22   118/71


 


 04/07/19 07:23    94 H   28 H   107/65


 


 04/07/19 06:50    99 H   28 H   98/64


 


 04/07/19 06:11   104 H    12  


 


 04/07/19 05:51   117 H    14  


 


 04/07/19 05:48    112 H   14   125/95


 


 04/07/19 05:28  97.7 F  120 H    16  162/117 














  Pulse Ox


 


 04/07/19 08:10 


 


 04/07/19 07:56  92 L


 


 04/07/19 07:25  92 L


 


 04/07/19 07:23  96


 


 04/07/19 06:50  94 L


 


 04/07/19 06:11  97


 


 04/07/19 05:51  95


 


 04/07/19 05:48  94 L


 


 04/07/19 05:28  92 L














- General


General Appearance: Alert, Oriented x3, Cooperative, Moderate distress


Limitations: No limitations





- Head


Head exam: Atraumatic, Normocephalic, Normal inspection


Head exam detail: negative: Abrasion, Contusion, Bautista's sign, General 

tenderness, Hematoma, Laceration





- Eye


Eye exam: Normal appearance.  negative: Conjunctival injection, Periorbital 

swelling, Periorbital tenderness, Scleral icterus





- ENT


Ear exam: negative: Auricular hematoma, Auricular trauma


Nasal Exam: negative: Active bleeding, Discharge, Dried blood, Foreign body


Mouth exam: negative: Drooling, Laceration, Muffled voice, Tongue elevation





- Neck


Neck exam: Normal inspection.  negative: Meningismus, Tenderness





- Respiratory


Respiratory exam: Decreased breath sounds, Respiratory distress.  negative: 

Rales, Rhonchi, Stridor





- Cardiovascular


Cardiovascular Exam: Normal rhythm, Normal heart sounds, Tachycardia





- GI/Abdominal


GI/Abdominal exam: Soft.  negative: Rebound, Rigid, Tenderness





- Rectal


Rectal exam: Deferred





- 


 exam: Deferred





- Extremities


Extremities exam: Normal inspection.  negative: Pedal edema, Tenderness





- Back


Back exam: Denies: CVA tenderness (R), CVA tenderness (L)





- Neurological


Neurological exam: Alert, Normal gait, Oriented X3





- Psychiatric


Psychiatric exam: Normal affect, Normal mood





- Skin


Skin exam: Normal color.  negative: Abrasion


Type of lesion: negative: abrasion





Results





- Labs


Result Diagrams: 


 04/07/19 05:44





 04/07/19 05:44


Labs Last 24 Hours: 


 Laboratory Results - last 24 hr











  04/07/19 04/07/19





  05:44 05:44


 


WBC  8.0 


 


RBC  4.81 


 


Hgb  14.7 


 


Hct  46.1 


 


MCV  95.8 


 


MCH  30.6 


 


MCHC  31.9 L 


 


RDW  14.8 H 


 


Plt Count  146 


 


MPV  9.5 


 


Gran %  59.3 


 


Lymphocytes %  30.7 


 


Monocytes %  8.9 


 


Eosinophils %  1.0 


 


Basophils %  0.1 


 


Sodium   144


 


Potassium   4.1


 


Chloride   103


 


Carbon Dioxide   31.0 H


 


Anion Gap   10.0


 


BUN   15


 


Creatinine   1.1


 


Estimated GFR   > 60


 


Random Glucose   149 H


 


Calcium   10.0


 


Total Bilirubin   0.50


 


AST   16


 


ALT   17


 


Alkaline Phosphatase   75


 


Troponin T   < 0.010


 


Total Protein   6.6


 


Albumin   4.2


 


Globulin   2.4


 


Albumin/Globulin Ratio   1.8














VTE H&P Assessment





- Risk for VTE


Risk for VTE: Yes


Risk Level: High


Risk Assessment Date: 04/07/19


Risk Assessment Time: 09:36


VTE Orders Placed or Will Be Placed: Yes





Plan





- Inpatient Certification


Inpatient Certification: 


Admit to inpatient care: Based on my medical assessment, after consideration of 

patient's risk factors (age, co-morbidities and patient presenting symptoms and 

acuity), I expect that this patient will remain in the hospital greater than or 

equal to two midnights and that the services needed warrant inpatient care 

because:





Patient Risk Factors: Acute COPD exacerbation





Estimated length of stay: 2 days  The patient may reasonably be expected to be 

discharged or transferred to a hospital within 96 hours after admission to 

[facility].





I certify that my determination is in accordance with my understanding of 

Medicare requirements for reasonable and necessary inpatient services.





04/07/19 09:37








- Detailed Diagnosis and Plan


(1) COPD exacerbation


Current Visit: Yes   Status: Acute   Base Code: J44.1 - CHRONIC OBSTRUCTIVE 

PULMONARY DISEASE W (ACUTE) EXACERBATION   Comment: 


4/7/19:


-CXR: Previously seen fibrosis, advanced COPD. 


- Currently on 2 liters oxygen and saturating  weaning down to 90% on RA which 

is pt's baseline


-Continue Prednisone as prescribed by PCP (10mg daily x 3 days, then back down 

to usual dose of 5mg daily)


-Continue with home nebulizer and inhaler regimen, and Symbicort, Spiriva daily

    





(2) Hypoxia


Current Visit: Yes   Status: Acute   Base Code: R09.02 - HYPOXEMIA   Comment: 


4/7/19:


-Baseline Sats 90-93% at baseline.    





(3) CAD (coronary artery disease)


Current Visit: Yes   Status: Acute   Base Code: I25.10 - ATHSCL HEART DISEASE 

OF NATIVE CORONARY ARTERY W/O ANG PCTRS   Comment: 


4/7/19:


- CAD s/p PCI with IRMA x 2. Troponin x 1 negative. 


- On statin, Plavix, BB 


- Cardiac monitoring    





(4) History of atrial fibrillation


Current Visit: Yes   Status: Acute   Base Code: Z86.79 - PERSONAL HISTORY OF 

OTHER DISEASES OF THE CIRCULATORY SYSTEM   Comment: 


4/7/19:


- s/p av charis ablation x 2 


- on BB and Eliquis.    





(5) Tobacco dependence due to cigarettes


Current Visit: No   Status: Acute   Base Code: F17.210 - NICOTINE DEPENDENCE, 

CIGARETTES, UNCOMPLICATED   Comment: 


4/7/19:


-Pt smoking 1/2 pk/daily. 


- On Wellbutrin 150mg for smoking cessation.   





(6) Full code status


Current Visit: No   Status: Acute   Base Code: Z78.9 - OTHER SPECIFIED HEALTH 

STATUS   Comment: 


4/7/19:


-Full code this admission   





(7) DVT prophylaxis


Current Visit: No   Status: Acute   Base Code: [id number] -    Comment: 


4/7/19:


-Moderate risk d/t age and co-morbids


-Continue home dose of Eliquis 5mg BID   





- Disposition


discharge home with instructions to avoid triggers by wearing a mask when doing 

yard work and begin taking azithromycin.

## 2019-04-07 NOTE — EMERGENCY DEPARTMENT RECORD
History of Present Illness





- General


Chief Complaint: Shortness of breath


Stated Complaint: TROUBLE BREATHING


Time Seen by Provider: 19 05:28


Source: Patient, EMS


Mode of Arrival: EMS


Limitations: No limitations





- History of Present Illness


Initial Comments: 





67 yo male with a previous history significant for COPD presents to ED for 

evaluation of CAITLIN that began approximately 5.5 hours ago.  Patient reports 

receiving Albuterol x 3 without significant improvement.  Patient reports 

recent admission and discharge home on oxygen as well.  Patient denies fevers, 

chills, or productive cough on examination.  Patient denies chest discomfort 

symptoms.


MD Complaint: Shortness of breath


Onset/Timin


-: Hour(s)


Severity: Moderate


Consistency: Constant


Improves With: Nothing


Worsens With: Exertion


Known History Of: COPD


Associated Symptoms: Denies other symptoms


Treatments Prior to Arrival: Bronchodilator





- Related Data


Home Oxygen Therapy: Yes


Home Oxygen Amount: 2 Liters


 Home Medications











 Medication  Instructions  Recorded  Confirmed  Last Taken


 


Bupropion HCl [Wellbutrin Xl] 150 mg PO DAILY 19 Unknown








 Previous Rx's











 Medication  Instructions  Recorded


 


Albuterol Sulfate 0.083% [Neb] 2.5 mg INH RESP.Q4H.WA 19





[Albuterol Sulfate] nebulization solution 


 


Calcium Carbonate [Tums] 500 mg PO Q4H PRN  tab.chew 19


 


Acetaminophen [Tylenol 500Mg Tab] 1,000 mg PO Q6H PRN  tablet 03/15/19


 


Metoprolol Succinate [Toprol Xl] 25 mg PO DAILY  tab.er.24h 03/15/19











 Allergies











Allergy/AdvReac Type Severity Reaction Status Date / Time


 


morphine AdvReac  BEHAVIORAL Verified 19 17:59





   CHANGES  














Review of Systems


Constitutional: Denies: Chills, Fever, Malaise, Night sweats


Eyes: Denies: Eye discharge, Eye pain


ENT: Denies: Congestion, Ear pain, Epistaxis


Respiratory: Reports: Dyspnea.  Denies: Cough, Hemoptysis


Cardiovascular: Reports: Dyspnea on exertion.  Denies: Chest pain, Edema


Endocrine: Denies: Fatigue, Heat or cold intolerance


Gastrointestinal: Denies: Abdominal pain, Nausea, Vomiting


Genitourinary: Denies: Incontinence, Retention


Musculoskeletal: Denies: Arthralgia, Back pain, Gout, Joint swelling


Skin: Denies: Bruising, Change in color, Change in hair/nails


Neurological: Denies: Abnormal gait, Confusion, Headache, Seizure


Psychiatric: Denies: Anxiety


Hematological/Lymphatic: Denies: Anemia, Blood Clots





Past Medical History





- SOCIAL HISTORY


Smoking Status: Light tobacco smoker (<10/day)


Drug Use: None





- RESPIRATORY


Hx Respiratory Disorders: Yes


Hx Bronchitis: Yes


Hx COPD: Yes


Hx Pneumonia: Yes


Comment:: Emphsema





- CARDIOVASCULAR


Hx Cardio Disorders: Yes


Hx Cardiac Cath: Yes (2 stents)


Hx Chest Pain: Yes


Hx Heart Attack: Yes


Hx Irregular Heartbeat: Yes (a-fib)


Hx Palpitations: Yes


Comment:: [facility]





- NEURO


Hx Neuro Disorders: Yes


Hx Seizures: Yes ("non-epileptiform spells", last 2016)





- GI


Hx GI Disorders: No





- 


Hx Genitourinary Disorders: No





- ENDOCRINE


Hx Endocrine Disorders: No





- MUSCULOSKELETAL


Hx Musculoskeletal Disorders: Yes


Hx Back Injury: Yes (L4-5 cage)





- PSYCH


Hx Psych Problems: Yes


Hx Anxiety: Yes


Hx Depression: Yes





- HEMATOLOGY/ONCOLOGY


Hx Hematology/Oncology Disorders: No


Comment:: on blood thinners for afib which is resolved





Family Medical History


Hx Cancer: Father





Physical Exam





- General


General Appearance: Alert, Oriented x3, Cooperative, Moderate distress


Limitations: No limitations





- Head


Head exam: Atraumatic, Normocephalic, Normal inspection


Head exam detail: negative: Abrasion, Contusion, Bautista's sign, General 

tenderness, Hematoma, Laceration





- Eye


Eye exam: Normal appearance.  negative: Conjunctival injection, Periorbital 

swelling, Periorbital tenderness, Scleral icterus





- ENT


Ear exam: negative: Auricular hematoma, Auricular trauma


Nasal Exam: negative: Active bleeding, Discharge, Dried blood, Foreign body


Mouth exam: negative: Drooling, Laceration, Muffled voice, Tongue elevation





- Neck


Neck exam: Normal inspection.  negative: Meningismus, Tenderness





- Respiratory


Respiratory exam: Decreased breath sounds, Respiratory distress.  negative: 

Rales, Rhonchi, Stridor





- Cardiovascular


Cardiovascular Exam: Normal rhythm, Normal heart sounds, Tachycardia





- GI/Abdominal


GI/Abdominal exam: Soft.  negative: Rebound, Rigid, Tenderness





- Rectal


Rectal exam: Deferred





- 


 exam: Deferred





- Extremities


Extremities exam: Normal inspection.  negative: Pedal edema, Tenderness





- Back


Back exam: Denies: CVA tenderness (R), CVA tenderness (L)





- Neurological


Neurological exam: Alert, Normal gait, Oriented X3





- Psychiatric


Psychiatric exam: Normal affect, Normal mood





- Skin


Skin exam: Normal color.  negative: Abrasion


Type of lesion: negative: abrasion





Course





- Reevaluation(s)


Reevaluation #1: 





19 05:46


Patient was seen and examined, Bipap initiated on arrival with Douneb.


EKG: Sinus tachycardia 112


Normal axis, normal intervals


No acute ST-T wave changes are present.


Reevaluation #2: 





19 06:06


CXR:


Chronic changes, nothing acute.


Reevaluation #3: 





19 06:16


Laboratory studies were reviewed and are grossly unremarkable for an acute 

process.


Patient reassessed, continues to improve clinically on Bipap.


Will admit for further evaluation.


Reevaluation #4: 





19 06:37


Patient reports significant improvement, will trial off the Bipap.


Case was discussed with Dr. Pineda, will accept admission at this time.





Medical Decision Making





- Lab Data


Result diagrams: 


 19 05:44





 19 05:44





Critical Care Time


Critical Care Time: Yes


Total Critical Care Time: 60


Critical Care Time: 





Treatment for respiratory failure resulting from COPD, Bipap management, EKG 

interpretation, laboratory study interpretation, and frequent reassessments.





Disposition


Disposition: Admit


Clinical Impression: 


 COPD exacerbation, Hypoxia





Respiratory failure


Qualifiers:


 Chronicity: acute Respiratory failure complication: hypoxia Qualified Code(s): 

J96.01 - Acute respiratory failure with hypoxia





Disposition: Still a Patient at [facility]


Decision to Admit: Admit from ER


Decision to Admit Date: 19


Decision to Admit Time: 06:17


Condition: (2) Stable


Forms:  Patient Portal Access


Time of Disposition: 06:17





Quality





- Quality Measures


Quality Measures: N/A





- Blood Pressure Screening


Does Patient Have Any of the Following: Active Dx of HTN


Blood Pressure Classification: Hypertensive Reading


Systolic Measurement: 162


Diastolic Measurement: 117


Screening for High Blood Pressure: Patient Exclusion, Hx of HTN []

## 2019-04-09 NOTE — RADIOLOGY REPORT
EXAM:  PORTABLE CHEST 



HISTORY:  DIFFICULTY BREATHING, HISTORY OF COPD.  



TECHNIQUE:  A single portable view of the chest was obtained.  



Comparison:  Chest radiograph 3/14/19.  



FINDINGS:  The cardiac silhouette is within normal size limits.  The lungs are 
hyperinflated.  Persistent blunting of the left costophrenic angle.  No 
definite new focal pulmonary opacification.  Linear opacities near the right 
lateral costophrenic angle suggesting atelectasis.  Additional scattered 
interstitial opacities pursuant from prior examinations.  



IMPRESSION:  

1.  MILD OPACITIES NEAR THE RIGHT LATERAL COSTOPHRENIC ANGLE SLIGHTLY MORE 
PROMINENT FROM PRIOR EXAMINATIONS, MOST SUGGESTIVE OF ATELECTASIS.  



2.  OTHERWISE SIMILAR APPEARANCE OF THE CHEST FROM PRIOR STUDIES, WITH CHRONIC 
INTERSTITIAL OPACITIES AND PULMONARY HYPERINFLATION COMPATIBLE WITH COPD.  



JOB NUMBER:  [id number]
[facility]

## 2019-04-24 ENCOUNTER — HOSPITAL ENCOUNTER (OUTPATIENT)
Dept: HOSPITAL 59 - ER | Age: 69
Setting detail: OBSERVATION
LOS: 1 days | Discharge: HOME | End: 2019-04-25
Attending: INTERNAL MEDICINE | Admitting: INTERNAL MEDICINE
Payer: COMMERCIAL

## 2019-04-24 DIAGNOSIS — R56.9: ICD-10-CM

## 2019-04-24 DIAGNOSIS — F17.210: ICD-10-CM

## 2019-04-24 DIAGNOSIS — J43.9: ICD-10-CM

## 2019-04-24 DIAGNOSIS — Z95.5: ICD-10-CM

## 2019-04-24 DIAGNOSIS — Z86.79: ICD-10-CM

## 2019-04-24 DIAGNOSIS — I25.10: ICD-10-CM

## 2019-04-24 DIAGNOSIS — R07.9: Primary | ICD-10-CM

## 2019-04-24 DIAGNOSIS — Z79.01: ICD-10-CM

## 2019-04-24 DIAGNOSIS — I25.2: ICD-10-CM

## 2019-04-24 LAB
ANION GAP SERPL CALC-SCNC: 10 MMOL/L (ref 7–16)
APTT BLD: 27.4 SECONDS (ref 24.5–39.1)
BASOPHILS NFR BLD: 0.2 % (ref 0–6)
BUN SERPL-MCNC: 13 MG/DL (ref 8–23)
CO2 SERPL-SCNC: 29 MMOL/L (ref 22–29)
CREAT SERPL-MCNC: 0.9 MG/DL (ref 0.7–1.2)
EOSINOPHIL NFR BLD: 1 % (ref 0–6)
ERYTHROCYTE [DISTWIDTH] IN BLOOD BY AUTOMATED COUNT: 14.7 % (ref 11.5–14.5)
EST GLOMERULAR FILTRATION RATE: > 60 ML/MIN
GLUCOSE SERPL-MCNC: 89 MG/DL (ref 74–109)
GRANULOCYTES NFR BLD: 57.6 % (ref 47–80)
HCT VFR BLD CALC: 45.7 % (ref 42–52)
HGB BLD-MCNC: 14.7 GM/DL (ref 14–18)
INR PPP: 1
LYMPHOCYTES NFR BLD AUTO: 29.8 % (ref 16–45)
MCH RBC QN AUTO: 30.3 PG (ref 27–33)
MCHC RBC AUTO-ENTMCNC: 32.2 G/DL (ref 32–36)
MCV RBC AUTO: 94.2 FL (ref 81–97)
MONOCYTES NFR BLD: 11.4 % (ref 0–9)
PLATELET # BLD: 169 K/UL (ref 130–400)
PMV BLD AUTO: 9.5 FL (ref 7.4–10.4)
PROTHROMBIN TIME: 10.2 SECONDS (ref 9.5–12.1)
RBC # BLD AUTO: 4.85 M/UL (ref 4.4–5.7)
WBC # BLD AUTO: 8.1 K/UL (ref 4.2–12.2)

## 2019-04-24 PROCEDURE — 93005 ELECTROCARDIOGRAM TRACING: CPT

## 2019-04-24 PROCEDURE — 96365 THER/PROPH/DIAG IV INF INIT: CPT

## 2019-04-24 PROCEDURE — 85025 COMPLETE CBC W/AUTO DIFF WBC: CPT

## 2019-04-24 PROCEDURE — 83880 ASSAY OF NATRIURETIC PEPTIDE: CPT

## 2019-04-24 PROCEDURE — 71045 X-RAY EXAM CHEST 1 VIEW: CPT

## 2019-04-24 PROCEDURE — 81003 URINALYSIS AUTO W/O SCOPE: CPT

## 2019-04-24 PROCEDURE — 94640 AIRWAY INHALATION TREATMENT: CPT

## 2019-04-24 PROCEDURE — 99220: CPT

## 2019-04-24 PROCEDURE — 84484 ASSAY OF TROPONIN QUANT: CPT

## 2019-04-24 PROCEDURE — 85610 PROTHROMBIN TIME: CPT

## 2019-04-24 PROCEDURE — 93306 TTE W/DOPPLER COMPLETE: CPT

## 2019-04-24 PROCEDURE — 85730 THROMBOPLASTIN TIME PARTIAL: CPT

## 2019-04-24 PROCEDURE — 93010 ELECTROCARDIOGRAM REPORT: CPT

## 2019-04-24 PROCEDURE — 80048 BASIC METABOLIC PNL TOTAL CA: CPT

## 2019-04-24 PROCEDURE — 99285 EMERGENCY DEPT VISIT HI MDM: CPT

## 2019-04-24 RX ADMIN — NITROGLYCERIN PRN MG: 0.4 TABLET SUBLINGUAL at 21:35

## 2019-04-24 RX ADMIN — NITROGLYCERIN PRN MG: 0.4 TABLET SUBLINGUAL at 21:30

## 2019-04-24 NOTE — EMERGENCY DEPARTMENT RECORD
History of Present Illness





- General


Chief Complaint: Chest Pain


Stated Complaint: CHEST PAIN


Time Seen by Provider: 04/24/19 21:19


Source: Patient, Family (wife)


Mode of Arrival: Ambulatory


Limitations: No limitations





- History of Present Illness


Initial Comments: 





Pt to ED from home where he had CP "10 of 10" in mid chest to right anterior 

neck and back.  Pt with hx of cardiac disease with stent 6 years ago.  No meds 

at home.  Significant hx of COPD - no relief at home with his O2.  Pt not 

taking asprin at this time per cardiologist.  No associated nausea, diaphoresis

, or change in breathing from baseline.  Pt has NTG at home but did not take 

PTA.  No recent illness or trauma.  


MD Complaint: Chest pain





- Related Data


 Home Medications











 Medication  Instructions  Recorded  Confirmed  Last Taken


 


Finasteride [Proscar] 5 mg PO DAILY 04/24/19 04/24/19 Unknown


 


Magnesium Oxide [Magnesium] 250 mg PO DAILY 04/24/19 04/24/19 Unknown








 Previous Rx's











 Medication  Instructions  Recorded


 


Albuterol Sulfate 0.083% [Neb] 2.5 mg INH RESP.Q4H.WA 01/09/19





[Albuterol Sulfate] nebulization solution 


 


Calcium Carbonate [Tums] 500 mg PO Q4H PRN  tab.chew 02/20/19


 


Acetaminophen [Tylenol 500Mg Tab] 1,000 mg PO Q6H PRN  tablet 03/15/19


 


Metoprolol Succinate [Toprol Xl] 25 mg PO DAILY  tab.er.24h 03/15/19











 Allergies











Allergy/AdvReac Type Severity Reaction Status Date / Time


 


morphine AdvReac  BEHAVIORAL Verified 03/14/19 17:59





   CHANGES  














Review of Systems


Constitutional: Denies: Chills, Fever, Weakness


Eyes: Denies: Eye discharge, Photophobia


ENT: Denies: Congestion, Ear pain, Throat pain


Respiratory: Denies: Cough, Dyspnea, Hemoptysis, Wheezes


Cardiovascular: Reports: As per HPI


Endocrine: Denies: Fatigue, Polyuria


Gastrointestinal: Denies: Abdominal pain, Diarrhea, Nausea, Vomiting


Genitourinary: Denies: Incontinence


Musculoskeletal: Reports: Back pain (associated with CP).  Denies: Arthralgia


Skin: Denies: Bruising, Rash


Neurological: Denies: Abnormal gait, Confusion, Headache


Psychiatric: Denies: Anxiety


Hematological/Lymphatic: Denies: Anemia





Past Medical History





- SOCIAL HISTORY


Smoking Status: Light tobacco smoker (<10/day)





- RESPIRATORY


Hx Respiratory Disorders: Yes


Hx Bronchitis: Yes


Hx COPD: Yes


Hx Pneumonia: Yes


Comment:: Emphsema





- CARDIOVASCULAR


Hx Cardio Disorders: Yes


Hx Cardiac Cath: Yes (2 stents)


Hx Chest Pain: Yes


Hx Heart Attack: Yes


Hx Irregular Heartbeat: Yes (a-fib)


Hx Palpitations: Yes


Comment:: [facility]





- NEURO


Hx Neuro Disorders: Yes


Hx Seizures: Yes ("non-epileptiform spells", last 2016)





- GI


Hx GI Disorders: No





- 


Hx Genitourinary Disorders: No





- ENDOCRINE


Hx Endocrine Disorders: No





- MUSCULOSKELETAL


Hx Musculoskeletal Disorders: Yes


Hx Back Injury: Yes (L4-5 cage)





- PSYCH


Hx Psych Problems: Yes


Hx Anxiety: Yes


Hx Depression: Yes





- HEMATOLOGY/ONCOLOGY


Hx Hematology/Oncology Disorders: No


Comment:: on blood thinners for afib which is resolved





Family Medical History


Any Significant Family History?: Yes


Hx Cancer: Father





Physical Exam





- General


General Appearance: Alert, Oriented x3, Cooperative, Mild distress





- Head


Head exam: Atraumatic





- Eye


Eye exam: Normal appearance, PERRL, EOMI





- ENT


ENT exam: Normal exam, Mucous membranes moist, Normal orophraynx, TM's normal 

bilaterally





- Neck


Neck exam: Normal inspection, Full ROM.  negative: Lymphadenopathy





- Respiratory


Respiratory exam: Normal lung sounds bilaterally.  negative: Accessory muscle 

use, Respiratory distress, Rhonchi, Wheezes





- Cardiovascular


Cardiovascular Exam: Regular rate, Normal rhythm.  negative: Diastolic murmur, 

Systolic murmur, Tachycardia


Peripheral Pulses: 2+: Radial (R), Radial (L)





- GI/Abdominal


GI/Abdominal exam: Soft, Normal bowel sounds.  negative: Tenderness





- Extremities


Extremities exam: Normal inspection.  negative: Pedal edema, Tenderness





- Back


Back exam: Reports: Normal inspection





- Neurological


Neurological exam: Alert, Normal gait, Oriented X3





- Psychiatric


Psychiatric exam: Normal affect, Normal mood





- Skin


Skin exam: Normal color.  negative: Pallor, Rash





Course





- Reevaluation(s)


Reevaluation #1: 





04/24/19 21:54


Exam on arrival.  EKG not acute, unchanged from prior.  CXR neg, labs and Trop 

normal.  Pain is 5 of 10 and unchanged with NTG SL x2 and aspirin. .  Pt has HA 

and bilateral flank pain onset at the start of CP.  It remains.  Back pain is 

NOT mid back and does NOT radiate to legs or abdomen.  Pulses equal fem.  No 

leg pains.  No hs renal stone, no radiation of flank pain to groin.  HA is 

generalized without visual change or nausea.  Due ot hx of cardiac disease we 

will admit for serial Trops and EKG with cardiology consult in AM.  Pt does not 

like Morphine so IV Tylenol given for pain.  will check urine. 





Procedures





- EKG Initial


Date: 04/24/19


Time: 21:22


EKG: No Acute Changes


EKG Detail: no change as to 4-24-19





Medical Decision Making





- Management Options


MDM Management: Additional Work-up Planned (e.g. ADM/Transfer/OP Study)





- Data Complexity


MDM Data: Labs Ordered and/or Reviewed, X-Ray Ordered and/or Reviewed, EKG 

Ordered and/or Reviewed, Independent Visualization of Image, Tracing, or 

Specimen, Decision to Obtain Old Record





- Lab Data


Result diagrams: 


 04/24/19 21:22





 04/24/19 21:22





- EKG Data


-: EKG Interpreted by Me


EKG: No Acute Changes





- Radiology Data


Radiology results: Image reviewed


-: Radiology Exam Interpreted by Myself





non acute








Disposition


Disposition: Admit


Clinical Impression: 


 Chest pain, CAD (coronary artery disease), Full code status





Disposition: Still a Patient at [facility]


Decision to Admit: Admit from ER


Decision to Admit Date: 04/24/19


Decision to Admit Time: 21:58


Accepting Physician: Dr. Pineda


Time Discussed w/Accepting Physician: 21:59 (Mary Grace on call message left)


Condition: (3) Guarded


Forms:  Patient Portal Access


Time of Disposition: 21:59





Quality





- Quality Measures


Quality Measures: N/A





- Blood Pressure Screening


Does Patient Have Any of the Following: No


Blood Pressure Classification: Hypertensive Reading


Systolic Measurement: 159


Diastolic Measurement: 91


Screening for High Blood Pressure: Patient Exclusion, Hx of HTN []

## 2019-04-25 LAB
APPEARANCE UR: CLEAR
BILIRUB UR-MCNC: NEGATIVE MG/DL
COLOR UR: YELLOW
GLUCOSE UR STRIP-MCNC: NEGATIVE MG/DL
KETONES UR QL STRIP: NEGATIVE
NITRITE UR QL STRIP: NEGATIVE
PROT UR QL STRIP: NEGATIVE
RBC # UR STRIP: NEGATIVE /UL
URINE LEUKOCYTE ESTERASE: NEGATIVE
UROBILINOGEN UR STRIP-ACNC: 0.2 E.U./DL (ref 0.2–1)

## 2019-04-25 NOTE — RADIOLOGY REPORT
EXAM:  CHEST, SINGLE VIEW



HISTORY:  CHEST PAIN.  



TECHNIQUE:  Frontal view of the chest was obtained.  



Comparison:  Chest radiograph 4/07/19.  



FINDINGS:  The cardiac silhouette is stable in size.  The lungs are 
hyperinflated.  Chronic lung findings including left suprahilar linear opacity 
and blunting of the left costophrenic angle.  Previous right lateral 
costophrenic angle opacities are decreased from prior examination.  No new 
focal pulmonary opacification.  No new focal pulmonary opacities.  



IMPRESSION:  

1.  NO NEW/ACUTE LUNG FINDINGS.  



2.  IMPROVED AERATION IN THE RIGHT LUNG BASE.  



3.  PULMONARY HYPERINFLATION WITH CHRONIC PULMONARY OPACITIES COMPATIBLE WITH 
COPD.  



JOB NUMBER:  [id number]
MTDD

## 2019-04-25 NOTE — HISTORY & PHYSICAL
History of Present Illness





- Date of Service


Date of Service for History & Physical: 04/25/19





- History of Present Illness


Admitting Diagnosis: Chest pain


History of Present Illness: 


Pierre Hussein is a 69 y/o male brought to ED for chest pain 10/10 mid chest 

and right anterior neck and upper back yesterday. He does have a significant 

cardiac history of MI with stents 2016. Patient also has a history of COPD and 

is currently working with his pulmonologist for partial lobectomies in the 

future to help with lung capacity. He denied any CAITLIN, shortness of breath 

outside of baseline, diaphoresis, dizziness at the time chest pain occurred. 

Patient does have nitroglycerin at home but did not take prior to arrival. 

Medical history includes current every day smoker (quit 6-7 days ago), COPD, 

emphysema, a-fib, MI 2012, seizure disorder, lumbar L4-5 cage, anxiety, 

depression.











While in ED CBC/CMP were normal, coags normal, pro .3, troponin #1,2 <

0.010, U/A normal, EKG NSR with probable atrial enlargement. Was given Ofirmiv 

and Nitro SL x 2 with resolution of chest discomfort. Admitted to floor for 

observation of chest pain, serial cardiac enzymes.














4/25/19- resting in bed comfortably, reports no further chest discomfort since 

admission to the floor. Reports after Ofirmiv and Nitro he felt 100% better. 

Does report the last time he had this type of chest pain it was an MI. Of note, 

he reports he just started pulmonary rehab at [facility] the day of incident, no 

chest pain with ambulation and therapy. Chest pain began about 6 hours after 

his therapy session. Does admit to a lot of upper body exercising with the 

rowing machine while there. He does use home O2 2L at night only. 








Travel Screening





- Travel/Exposure Within Last 30 Days


Have you traveled within the last 30 days?: No





- Travel/Exposure Within Last Year


Have you traveled outside the [place] in the last year?: No





- Additonal Travel Details


Have you been exposed to anyone with a communicable illness?: Yes


Exposure Details:: Spouse vomiting and tired





- Travel Symptoms


Symptom Screening: Headache





Review of Systems


Constitutional: Denies: Chills, Fever, Weakness


Eyes: Denies: Eye discharge, Photophobia


ENT: Denies: Congestion, Ear pain, Throat pain


Respiratory: Denies: Cough, Dyspnea, Hemoptysis, Wheezes


Cardiovascular: Reports: As per HPI


Endocrine: Denies: Fatigue, Polyuria


Gastrointestinal: Denies: Abdominal pain, Diarrhea, Nausea, Vomiting


Genitourinary: Denies: Incontinence


Musculoskeletal: Denies: Arthralgia, Back pain (associated with CP)


Skin: Denies: Bruising, Rash


Neurological: Denies: Abnormal gait, Confusion, Headache


Psychiatric: Denies: Anxiety


Hematological/Lymphatic: Denies: Anemia





Past Medical History





- SOCIAL HISTORY


Smoking Status: Light tobacco smoker (<10/day)


Alcohol Use: None


Drug Use: None





- RESPIRATORY


Hx Respiratory Disorders: Yes


Hx Bronchitis: Yes


Hx COPD: Yes


Hx Pneumonia: Yes


Comment:: Em[facility]sema





- CARDIOVASCULAR


Hx Cardio Disorders: Yes


Hx Cardiac Cath: Yes (2 stents)


Hx Chest Pain: Yes


Hx Heart Attack: Yes


Hx Irregular Heartbeat: Yes (a-fib)


Hx Palpitations: Yes


Comment:: [facility]





- NEURO


Hx Neuro Disorders: Yes


Hx Seizures: Yes ("non-epileptiform spells", last 2016)





- GI


Hx GI Disorders: No





- 


Hx Genitourinary Disorders: No





- ENDOCRINE


Hx Endocrine Disorders: No





- MUSCULOSKELETAL


Hx Musculoskeletal Disorders: Yes


Hx Back Injury: Yes (L4-5 cage)





- PSYCH


Hx Psych Problems: Yes


Hx Anxiety: Yes


Hx Depression: Yes





- HEMATOLOGY/ONCOLOGY


Hx Hematology/Oncology Disorders: No


Comment:: on blood thinners for afib which is resolved





Family Medical History


Any Significant Family History?: Yes


Hx Cancer: Father





H&P Meds/Allergies





- Allergies


Allergies: 


 Allergies











Allergy/AdvReac Type Severity Reaction Status Date / Time


 


morphine AdvReac  BEHAVIORAL Verified 03/14/19 17:59





   CHANGES  














- Home Medications


 Home Medications











 Medication  Instructions  Recorded  Confirmed  Last Taken


 


Finasteride [Proscar] 5 mg PO QHS 04/24/19 04/25/19 Unknown


 


Magnesium Oxide [Magnesium] 250 mg PO DAILY 04/24/19 04/24/19 Unknown


 


Loratadine 10 mg PO DAILY 04/25/19 04/25/19 Unknown


 


Prednisone [Prednisone 10Mg] 10 mg PO DAILY 04/25/19 04/25/19 Unknown








 Previous Rx's











 Medication  Instructions  Recorded


 


Albuterol Sulfate 0.083% [Neb] 2.5 mg INH RESP.Q4H.WA 01/09/19





[Albuterol Sulfate] nebulization solution 


 


Calcium Carbonate [Tums] 500 mg PO Q4H PRN  tab.chew 02/20/19


 


Acetaminophen [Tylenol 500Mg Tab] 1,000 mg PO Q6H PRN  tablet 03/15/19


 


Metoprolol Succinate [Toprol Xl] 25 mg PO DAILY  tab.er.24h 03/15/19














- Active Medications


Active Medications: 


 Current Medications





Acetaminophen (Tylenol 500mg Tab)  1,000 mg PO Q6H PRN


   PRN Reason: PAIN - MILD(1-4)/FEVER


Albuterol Sulfate (Albuterol Sulfate)  2.5 mg INH Q4H PRN


   PRN Reason: DIFFICULTY IN BREATHING


Aspirin (Ecotrin (Ec))  81 mg PO DAILY [facility]


   Last Admin: 04/25/19 09:30 Dose:  81 mg


Sodium Chloride ()  1,000 mls @ 15 mls/hr IV .Q24H PRN


   PRN Reason: LARGE VOLUME IV


   Last Admin: 04/24/19 21:28 Dose:  15 mls/hr


Nitroglycerin (Nitrostat 0.4mg)  0.4 mg SL Q5MIN PRN


   PRN Reason: CHEST PAIN


   Last Admin: 04/24/19 21:35 Dose:  0.4 mg


Patient Own Med: (Bupropion Er 150 Mg)  1 each PO DAILY [facility]


   Last Admin: 04/25/19 09:30 Dose:  1 each


Patient Own Med: (Eliquis 5 Mg)  1 each PO BID [facility]


   Last Admin: 04/25/19 09:31 Dose:  1 each


Patient Own Med: Metoprolol Succinate 25 Mg  1 each PO DAILY [facility]


   Last Admin: 04/25/19 09:31 Dose:  1 each


Patient Own Med: (Prednisone 10 Mg)  1 each PO DAILY [facility]


   Last Admin: 04/25/19 09:32 Dose:  1 each


Patient Own Med: (Loratadine 10 Mg)  1 each PO DAILY [facility]


   Last Admin: 04/25/19 09:31 Dose:  1 each


Patient Own Med: (Atorvastatin 80 Mg)  1 each PO QHS [facility]


Patient Own Med: (Azithromycin 250 Mg)  1 each PO QHS [facility]


Patient Own Med: (Finasteride 5 Mg)  1 each PO QHS [facility]


Patient Own Med: Spiriva Handihaler 18 Mcg  1 each INH DAILY [facility]


   Last Admin: 04/25/19 09:32 Dose:  1 each


Patient Own Med: Symbicort 160-4.5 Mcg  2 each INH BID [facility]


   Last Admin: 04/25/19 09:33 Dose:  2 each











Physical Exam





- Vital Signs


Vital Signs: 


 Vital Signs - Last 24 Hrs











  Temp Pulse Pulse Pulse Resp BP BP


 


 04/25/19 09:30   70    18  


 


 04/25/19 09:00    70  66  20  


 


 04/25/19 08:25  98.1 F    66  20   130/81


 


 04/25/19 04:33  97.7 F   70  76  16   148/80


 


 04/24/19 23:53     63  20   115/65


 


 04/24/19 23:20    66  68  20  


 


 04/24/19 22:40  96.8 F L    68  20   116/62


 


 04/24/19 21:41    88   20   125/79


 


 04/24/19 21:34    79   20   143/82


 


 04/24/19 21:28    75   22   141/79


 


 04/24/19 21:14  97.6 F  86    24  159/91 














  Pulse Ox


 


 04/25/19 09:30  94 L


 


 04/25/19 09:00 


 


 04/25/19 08:25  93 L


 


 04/25/19 04:33  99


 


 04/24/19 23:53  98


 


 04/24/19 23:20 


 


 04/24/19 22:40  96


 


 04/24/19 21:41  96


 


 04/24/19 21:34  97


 


 04/24/19 21:28  98


 


 04/24/19 21:14  93 L














- General


General Appearance: Alert, Oriented x3, Cooperative, No acute distress


Limitations: No limitations





- Head


Head exam: Atraumatic





- Eye


Eye exam: Normal appearance, PERRL, EOMI





- ENT


ENT exam: Normal exam, Mucous membranes moist, Normal orophraynx, TM's normal 

bilaterally





- Neck


Neck exam: Normal inspection, Full ROM.  negative: Lymphadenopathy





- Respiratory


Respiratory exam: Normal lung sounds bilaterally, Wheezes (scattered bilat bases

).  negative: Accessory muscle use, Respiratory distress, Rhonchi





- Cardiovascular


Cardiovascular Exam: Regular rate, Normal rhythm.  negative: Diastolic murmur, 

Systolic murmur, Tachycardia


Peripheral Pulses: 2+: Radial (R), Radial (L), Dorsalis Pedis (R), Dorsalis 

Pedis (L)





- GI/Abdominal


GI/Abdominal exam: Soft, Normal bowel sounds.  negative: Tenderness





- Extremities


Extremities exam: Normal inspection.  negative: Pedal edema, Tenderness





- Back


Back exam: Reports: Normal inspection





- Neurological


Neurological exam: Alert, Normal gait, Oriented X3





- Psychiatric


Psychiatric exam: Normal affect, Normal mood





- Skin


Skin exam: Normal color.  negative: Pallor, Rash





Results





- Labs


Result Diagrams: 


 04/24/19 21:22





 04/24/19 21:22


Labs Last 24 Hours: 


 Laboratory Results - last 24 hr











  04/24/19 04/24/19 04/24/19





  21:22 21:22 21:22


 


WBC  8.1  


 


RBC  4.85  


 


Hgb  14.7  


 


Hct  45.7  


 


MCV  94.2  


 


MCH  30.3  


 


MCHC  32.2  


 


RDW  14.7 H  


 


Plt Count  169  


 


MPV  9.5  


 


Gran %  57.6  


 


Lymphocytes %  29.8  


 


Monocytes %  11.4 H  


 


Eosinophils %  1.0  


 


Basophils %  0.2  


 


PT   10.2 


 


INR   1.0 


 


APTT   27.4 


 


Sodium    142


 


Potassium    4.0


 


Chloride    103


 


Carbon Dioxide    29.0


 


Anion Gap    10.0


 


BUN    13


 


Creatinine    0.9


 


Estimated GFR    > 60


 


Random Glucose    89


 


Calcium    9.8


 


Troponin T    < 0.010


 


NT-Pro-B Natriuret Pep    275.30 H


 


Urine Color   


 


Urine Appearance   


 


Urine pH   


 


Ur Specific Gravity   


 


Urine Protein   


 


Urine Glucose (UA)   


 


Urine Ketones   


 


Urine Blood   


 


Urine Nitrite   


 


Urine Bilirubin   


 


Urine Urobilinogen   


 


Ur Leukocyte Esterase   














  04/25/19 04/25/19 04/25/19





  01:22 02:00 10:25


 


WBC   


 


RBC   


 


Hgb   


 


Hct   


 


MCV   


 


MCH   


 


MCHC   


 


RDW   


 


Plt Count   


 


MPV   


 


Gran %   


 


Lymphocytes %   


 


Monocytes %   


 


Eosinophils %   


 


Basophils %   


 


PT   


 


INR   


 


APTT   


 


Sodium   


 


Potassium   


 


Chloride   


 


Carbon Dioxide   


 


Anion Gap   


 


BUN   


 


Creatinine   


 


Estimated GFR   


 


Random Glucose   


 


Calcium   


 


Troponin T   < 0.010  < 0.010


 


NT-Pro-B Natriuret Pep   


 


Urine Color  Yellow  


 


Urine Appearance  Clear  


 


Urine pH  5.5  


 


Ur Specific Gravity  >= 1.030  


 


Urine Protein  Negative  


 


Urine Glucose (UA)  Negative  


 


Urine Ketones  Negative  


 


Urine Blood  Negative  


 


Urine Nitrite  Negative  


 


Urine Bilirubin  Negative  


 


Urine Urobilinogen  0.2  


 


Ur Leukocyte Esterase  Negative  














- Imaging and Cardiology


  ** Chest x-ray


Status: Report reviewed (no acute process)





VTE H&P Assessment





- Risk for VTE


Risk for VTE: Yes


Risk Level: Low


Risk Assessment Date: 04/25/19


Risk Assessment Time: 14:29


VTE Orders Placed or Will Be Placed: Yes





AMI H&P Plan





- EKG Initial


Date: 04/24/19


Time: 21:22


EKG: No Acute Changes


EKG Detail: no change as to 4-24-19





Plan





- Detailed Diagnosis and Plan


(1) Chest pain


Current Visit: Yes   Status: Acute   Base Code: R07.9 - CHEST PAIN, UNSPECIFIED

   Comment: 4/25/19


- Chest pain similar to previous episode 6-7 years ago when he did have MI


- Troponin 1,2,3 <0.010


- EKG NSR


- No further chest pain since arrival to floor


- Echo today


- Nitro 0.4mg SL Q 5 min PRN   





(2) COPD (chronic obstructive pulmonary disease)


Current Visit: Yes   Status: Acute   Base Code: J44.9 - CHRONIC OBSTRUCTIVE 

PULMONARY DISEASE, UNSPECIFIED   Comment: 4/25/19


- Albuterol 2.5mg Q4hr PRN


- Spiriva 18mcg QD


- Symbicort 160-4.8 2 puffs BID


- Prednisone 10mg daily


- Azithromycin 250mg QD ( prophylaxis)   





(3) History of atrial fibrillation


Current Visit: No   Status: Acute   Base Code: Z86.79 - PERSONAL HISTORY OF 

OTHER DISEASES OF THE CIRCULATORY SYSTEM   Comment: 


4/25/19:


- s/p av charis ablation x 2 


- on Metoprolol Succinate 25mg QD and Eliquis 5mg BID.    





(4) Full code status


Current Visit: Yes   Status: Acute   Base Code: Z78.9 - OTHER SPECIFIED HEALTH 

STATUS   Comment: 


4/25/19:


-Full code this admission   





(5) DVT prophylaxis


Current Visit: No   Status: Acute   Base Code: [id number] -    Comment: 


4/25/19:


-Moderate risk d/t age and co-morbids


-Continue home dose of Eliquis 5mg BID

## 2019-04-25 NOTE — DISCHARGE SUMMARY
Providers


Discharge Summary Date: 04/25/19


Date of admission: 


04/24/19 22:30





Expected Date of Discharge: 04/25/19


Attending physician: 


AGAPITO STREETER





Primary care physician: 


TERRY HITCHCOCK D.O.








Physical Exam





- Vital Signs


Vital Signs: 


 Vital Signs - Last 24 Hrs











  Temp Pulse Pulse Pulse Resp BP BP


 


 04/25/19 14:00  97.7 F    86  16   121/73


 


 04/25/19 09:30   70    18  


 


 04/25/19 09:00    70  66  20  


 


 04/25/19 08:25  98.1 F    66  20   130/81


 


 04/25/19 04:33  97.7 F   70  76  16   148/80


 


 04/24/19 23:53     63  20   115/65


 


 04/24/19 23:20    66  68  20  


 


 04/24/19 22:40  96.8 F L    68  20   116/62


 


 04/24/19 21:41    88   20   125/79


 


 04/24/19 21:34    79   20   143/82


 


 04/24/19 21:28    75   22   141/79


 


 04/24/19 21:14  97.6 F  86    24  159/91 














  Pulse Ox


 


 04/25/19 14:00  92 L


 


 04/25/19 09:30  94 L


 


 04/25/19 09:00 


 


 04/25/19 08:25  93 L


 


 04/25/19 04:33  99


 


 04/24/19 23:53  98


 


 04/24/19 23:20 


 


 04/24/19 22:40  96


 


 04/24/19 21:41  96


 


 04/24/19 21:34  97


 


 04/24/19 21:28  98


 


 04/24/19 21:14  93 L














- General


General Appearance: Alert, Oriented x3, Cooperative, No acute distress


Limitations: No limitations





- Head


Head exam: Atraumatic





- Eye


Eye exam: Normal appearance, PERRL, EOMI





- ENT


ENT exam: Normal exam, Mucous membranes moist, Normal orophraynx, TM's normal 

bilaterally





- Neck


Neck exam: Normal inspection, Full ROM.  negative: Lymphadenopathy





- Respiratory


Respiratory exam: Normal lung sounds bilaterally, Wheezes (scattered bilat bases

).  negative: Accessory muscle use, Respiratory distress, Rhonchi





- Cardiovascular


Cardiovascular Exam: Regular rate, Normal rhythm.  negative: Diastolic murmur, 

Systolic murmur, Tachycardia


Peripheral Pulses: 2+: Radial (R), Radial (L), Dorsalis Pedis (R), Dorsalis 

Pedis (L)





- GI/Abdominal


GI/Abdominal exam: Soft, Normal bowel sounds.  negative: Tenderness





- Extremities


Extremities exam: Normal inspection.  negative: Pedal edema, Tenderness





- Back


Back exam: Reports: Normal inspection





- Neurological


Neurological exam: Alert, Normal gait, Oriented X3





- Psychiatric


Psychiatric exam: Normal affect, Normal mood





- Skin


Skin exam: Normal color.  negative: Pallor, Rash





Hospitalization





- Hospitalization


Admission Diagnosis: Chest pain





- Problem List/Discharge Diagnosis


(1) Chest pain


Status: Acute   Base Code: R07.9 - CHEST PAIN, UNSPECIFIED   Comment: 4/25/19


- Chest pain similar to previous episode 6-7 years ago when he did have MI


- Troponin 1,2,3 <0.010


- EKG NSR


- No further chest pain since arrival to floor


- Echo today


- Nitro 0.4mg SL Q 5 min PRN   





(2) COPD (chronic obstructive pulmonary disease)


Status: Acute   Base Code: J44.9 - CHRONIC OBSTRUCTIVE PULMONARY DISEASE, 

UNSPECIFIED   Comment: 4/25/19


- Albuterol 2.5mg Q4hr PRN


- Spiriva 18mcg QD


- Symbicort 160-4.8 2 puffs BID


- Prednisone 10mg daily


- Azithromycin 250mg QD ( prophylaxis)   





(3) History of atrial fibrillation


Status: Acute   Base Code: Z86.79 - PERSONAL HISTORY OF OTHER DISEASES OF THE 

CIRCULATORY SYSTEM   Comment: 


4/25/19:


- s/p av charis ablation x 2 


- on Metoprolol Succinate 25mg QD and Eliquis 5mg BID.    





(4) Full code status


Status: Acute   Base Code: Z78.9 - OTHER SPECIFIED HEALTH STATUS   Comment: 


4/25/19:


-Full code this admission   





(5) DVT prophylaxis


Status: Acute   Base Code: NFK5458 -    Comment: 


4/25/19:


-Moderate risk d/t age and co-morbids


-Continue home dose of Eliquis 5mg BID   





- Hospitalization Course


Disposition: Home, Self-Care


Hospital Course: 


Pierre Hussein is a 67 y/o male brought to ED for chest pain 10/10 mid chest 

and right anterior neck and upper back yesterday. He does have a significant 

cardiac history of MI with stents 2016. Patient also has a history of COPD and 

is currently working with his pulmonologist for partial lobectomies in the 

future to help with lung capacity. He denied any CAITLIN, shortness of breath 

outside of baseline, diaphoresis, dizziness at the time chest pain occurred. 

Patient does have nitroglycerin at home but did not take prior to arrival. 

Medical history includes current every day smoker (quit 6-7 days ago), COPD, 

emphysema, a-fib, MI 2012, seizure disorder, lumbar L4-5 cage, anxiety, 

depression.











While in ED CBC/CMP were normal, coags normal, pro .3, troponin #1,2 <

0.010, U/A normal, EKG NSR with probable atrial enlargement. Was given Ofirmiv 

and Nitro SL x 2 with resolution of chest discomfort. Admitted to floor for 

observation of chest pain, serial cardiac enzymes.














4/25/19 0930- resting in bed comfortably, reports no further chest discomfort 

since admission to the floor. Reports after Ofirmiv and Nitro he felt 100% 

better. Does report the last time he had this type of chest pain it was an MI. 

Of note, he reports he just started pulmonary rehab at [facility] the day of 

incident, no chest pain with ambulation and therapy. Chest pain began about 6 

hours after his therapy session. Does admit to a lot of upper body exercising 

with the rowing machine while there. He does use home O2 2L at night only. 








4/25/19 1700- Echo report reveals EF 60-65%, normal pulmonary pressures, no 

aortic stenosis. Remained symptom-free through the day. Troponin #3 <0.010. 

Will DC home, symptoms likely musculoskeletall in nature. Advise he follow up 

with his cardiologist 1 week after discharge. Return to ER if any recurrent 

chest pain and take Nitro SL at home for chest pain








PCP: Dr Faulkner


Cardiologist: [facility]


Pulmonologist: [facility]





Procedures: 


Imaging and X-Rays





04/24/19 21:19


CHEST 1 VIEW [RAD] Stat 








Cardiology Procedures





04/24/19 21:19


Cardiac Monitor NOW 


EKG NOW 





04/24/19 22:36


Cardiac Monitor .Continuous 


EKG QDX2@0600 





04/25/19 09:55


Echo W/CF & Cardiac Doppler NOW 











Abnormal Labs: 


 Abnormal Lab Results











  04/24/19 04/24/19 Range/Units





  21:22 21:22 


 


RDW  14.7 H   (11.5-14.5)  %


 


Monocytes %  11.4 H   (0-9)  %


 


NT-Pro-B Natriuret Pep   275.30 H  (<125)  pg/mL











Condition at Discharge: (3) Guarded





Discharge Medications





- Discharge Medications


Home Medications: 


 Ambulatory Orders





Alendronate Sodium [Fosamax] 70 mg PO WEEKLY 01/07/19 [Last Taken 1 Day Ago ~03/ 13/19]


Apixaban [Eliquis] 5 mg PO BID 01/07/19 [Last Taken 1 Day Ago ~03/13/19]


Atorvastatin Calcium 80 mg PO QHS 01/07/19 [Last Taken 1 Day Ago ~03/13/19]


Budesonide/Formoterol Fumarate [Symbicort 160-4.5 Mcg Inhaler] 2 inh IH BID 01/ 07/19 [Last Taken 1 Day Ago ~03/13/19]


Nitroglycerin 0.4MG [Nitrostat 0.4MG] 1 tab SL Q5MIN PRN 01/07/19 [Last Taken 1 

Day Ago ~03/13/19]


Tiotropium Bromide [Spiriva] 18 mcg IH DAILY 01/07/19 [Last Taken 1 Day Ago ~03/ 13/19]


Ubidecarenone [Coenzyme Q10] 30 mg PO QHS 01/07/19 [Last Taken 1 Day Ago ~03/13/ 19]


Albuterol Sulfate 0.083% [Neb] [Albuterol Sulfate] 2.5 mg INH RESP.Q4H.WA  

nebulization solution 01/09/19 [Last Taken 1 Day Ago ~03/13/19]


Calcium Carbonate [Tums] 500 mg PO Q4H PRN  tab.chew 02/20/19 [Last Taken 1 Day 

Ago ~03/13/19]


Acetaminophen [Tylenol 500Mg Tab] 1,000 mg PO Q6H PRN  tablet 03/15/19 [Last 

Taken Unknown]


Metoprolol Succinate [Toprol Xl] 25 mg PO DAILY  tab.er.24h 03/15/19 [Last 

Taken Unknown]


Bupropion HCl [Wellbutrin Xl] 150 mg PO DAILY 04/07/19 [Last Taken Unknown]


Finasteride [Proscar] 5 mg PO QHS 04/24/19 [Last Taken Unknown]


Magnesium Oxide [Magnesium] 250 mg PO DAILY 04/24/19 [Last Taken Unknown]


Aspirin Enteric-Coated [Ecotrin (EC)] 81 mg PO DAILY  tabec 04/25/19 [Last 

Taken Unknown]


Loratadine 10 mg PO DAILY 04/25/19 [Last Taken Unknown]


Nitroglycerin 0.4MG [Nitrostat 0.4MG] 0.4 mg SL Q5MIN PRN  tab.subl 04/25/19 [

Last Taken Unknown]


Prednisone [Prednisone 10Mg] 10 mg PO DAILY 04/25/19 [Last Taken Unknown]











Discharge Plan





- Discharge Instructions


Activity at Discharge: Increase Activity as Tolerated


Diet at Discharge: Low Fat, Low Cholesterol


Instructions:  Coronary Artery Disease (DC), Chest Pain (DC), COPD (Chronic 

Obstructive Pulmonary Disease) (DC)


Additional Instructions: 


Follow up with Cardiologist in 1 week





 2





Activity: 


 


 Increase Activity as Tolerated








 2





Diet: Low Fat, Low Cholesterol








 2





Consults: []








 2





Follow Up: []WITH CARDIOLOGIST IN ONE WEEK








 2





Dressing/Wound Care:  





 (Type)   (Change) 








 2





Additional: []











Quality Measures





- Quality Measures


Quality Measures: Advance Directives, Coronary Artery Disease: Antiplatelet 

Therapy, Documentation of Current Medications in Medical Record, Elder 

Maltreatment Screen and Follow-Up Plan, Screening for High Blood Pressure and F/

U Documented





- Current Medications


Quality Measure: Measure #130: Documentation of Current Medications


Documentation of Current Medications: <Current Medications Documented/Reviewed> 

[]





- Blood Pressure Screening


Quality Measure: Screening for High Blood Pressure and Follow-Up Documented


Does Patient Have Any of the Following: No


Blood Pressure Classification: Hypertensive Reading


Systolic Measurement: 159


Diastolic Measurement: 91


Screening for High Blood Pressure: < First Hypertensive BP, F/U Documented > [

]


First Hypertensive Follow-up Interventions: Follow-up with rescreen GT 1 day 

and LT 4 weeks.





- Coronary Artery Disease


Quality Measure: Measure #6: Coronary Artery Disease (CAD)


Antiplatelet Therapy: <ASA or clopidogrel prescribed> [4723Z]





- Advance Directives


Quality Measure: Measure #47: Care Plan


Advance Directives Established: No


Advance Directives Information Provided To Patient: Yes


Advance Directives on File: Yes


Living Will: No


Power of : No


Advance Care Planning: <Care Plan/Decision Maker Documented; Discussed & 

Documented> [8539F]





- Elder Abuse Suspicion Index


Screening: Elder Abuse Suspicion Index Screening


Rely on people for bathing, dressing, shopping, banking, etc: No


Prevented from getting food, clothes, medication, etc: No


Made to feel shamed or threatened by someone: No


Forced to sign papers or use money against will: No


Feel afraid, touched in ways not wanted or hurt physically: No


Poor eye contact, withdrawn, malnourished, cuts or bruises: No


Screening Result: Negative result


EASI Reference Information: Thierno SALDIVAR, Barney MEDINA, Darling GRACE, Wade EATON.Development and validation of a tool to assist physicians identification of 

elder abuse: The Elder Abuse Suspicion  Index (EASI ).  Journal of Elder Abuse 

and Neglect, 2008; 20 (3): 276-300.





- Elder Maltreatment Screen


Quality Measures: Elder Maltreatment Screen and Follow-Up Plan


Elder Maltreatment Screen: <Negative, No Follow-Up Plan Required> []